# Patient Record
Sex: MALE | URBAN - METROPOLITAN AREA
[De-identification: names, ages, dates, MRNs, and addresses within clinical notes are randomized per-mention and may not be internally consistent; named-entity substitution may affect disease eponyms.]

---
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
## 2023-11-15 ENCOUNTER — OFFICE VISIT (OUTPATIENT)
Dept: FAMILY MEDICINE CLINIC | Age: 18
End: 2023-11-15
Payer: COMMERCIAL

## 2023-11-15 VITALS
HEIGHT: 70 IN | RESPIRATION RATE: 16 BRPM | DIASTOLIC BLOOD PRESSURE: 78 MMHG | BODY MASS INDEX: 22.33 KG/M2 | TEMPERATURE: 97.9 F | SYSTOLIC BLOOD PRESSURE: 120 MMHG | OXYGEN SATURATION: 97 % | WEIGHT: 156 LBS | HEART RATE: 65 BPM

## 2023-11-15 DIAGNOSIS — K58.2 IRRITABLE BOWEL SYNDROME WITH BOTH CONSTIPATION AND DIARRHEA: ICD-10-CM

## 2023-11-15 DIAGNOSIS — F41.9 ANXIETY: Primary | ICD-10-CM

## 2023-11-15 PROCEDURE — 1036F TOBACCO NON-USER: CPT | Performed by: SURGERY

## 2023-11-15 PROCEDURE — 99204 OFFICE O/P NEW MOD 45 MIN: CPT | Performed by: SURGERY

## 2023-11-15 PROCEDURE — G8420 CALC BMI NORM PARAMETERS: HCPCS | Performed by: SURGERY

## 2023-11-15 PROCEDURE — G8427 DOCREV CUR MEDS BY ELIG CLIN: HCPCS | Performed by: SURGERY

## 2023-11-15 PROCEDURE — G8484 FLU IMMUNIZE NO ADMIN: HCPCS | Performed by: SURGERY

## 2023-11-15 RX ORDER — PSEUDOEPHEDRINE HCL 30 MG
TABLET ORAL
COMMUNITY

## 2023-11-15 RX ORDER — DICYCLOMINE HCL 20 MG
20 TABLET ORAL
COMMUNITY
Start: 2023-09-07

## 2023-11-15 RX ORDER — AMITRIPTYLINE HYDROCHLORIDE 10 MG/1
10 TABLET, FILM COATED ORAL NIGHTLY
COMMUNITY
Start: 2023-11-13

## 2023-11-15 RX ORDER — PANTOPRAZOLE SODIUM 40 MG/1
1 TABLET, DELAYED RELEASE ORAL
COMMUNITY
Start: 2023-09-07

## 2023-11-15 RX ORDER — ESCITALOPRAM OXALATE 10 MG/1
10 TABLET ORAL DAILY
Qty: 30 TABLET | Refills: 0 | Status: SHIPPED | OUTPATIENT
Start: 2023-11-15

## 2023-11-15 SDOH — ECONOMIC STABILITY: FOOD INSECURITY: WITHIN THE PAST 12 MONTHS, YOU WORRIED THAT YOUR FOOD WOULD RUN OUT BEFORE YOU GOT MONEY TO BUY MORE.: NEVER TRUE

## 2023-11-15 SDOH — ECONOMIC STABILITY: FOOD INSECURITY: WITHIN THE PAST 12 MONTHS, THE FOOD YOU BOUGHT JUST DIDN'T LAST AND YOU DIDN'T HAVE MONEY TO GET MORE.: NEVER TRUE

## 2023-11-15 SDOH — ECONOMIC STABILITY: HOUSING INSECURITY
IN THE LAST 12 MONTHS, WAS THERE A TIME WHEN YOU DID NOT HAVE A STEADY PLACE TO SLEEP OR SLEPT IN A SHELTER (INCLUDING NOW)?: NO

## 2023-11-15 SDOH — ECONOMIC STABILITY: INCOME INSECURITY: HOW HARD IS IT FOR YOU TO PAY FOR THE VERY BASICS LIKE FOOD, HOUSING, MEDICAL CARE, AND HEATING?: NOT HARD AT ALL

## 2023-11-15 ASSESSMENT — PATIENT HEALTH QUESTIONNAIRE - PHQ9
SUM OF ALL RESPONSES TO PHQ QUESTIONS 1-9: 0
SUM OF ALL RESPONSES TO PHQ9 QUESTIONS 1 & 2: 0
2. FEELING DOWN, DEPRESSED OR HOPELESS: 0
SUM OF ALL RESPONSES TO PHQ QUESTIONS 1-9: 0
1. LITTLE INTEREST OR PLEASURE IN DOING THINGS: 0

## 2023-11-15 ASSESSMENT — ENCOUNTER SYMPTOMS
SORE THROAT: 0
SHORTNESS OF BREATH: 0
CONSTIPATION: 1
ABDOMINAL PAIN: 1
DIARRHEA: 1
NAUSEA: 0
COUGH: 0

## 2023-11-15 ASSESSMENT — ANXIETY QUESTIONNAIRES
2. NOT BEING ABLE TO STOP OR CONTROL WORRYING: 1
7. FEELING AFRAID AS IF SOMETHING AWFUL MIGHT HAPPEN: 3
4. TROUBLE RELAXING: 3
3. WORRYING TOO MUCH ABOUT DIFFERENT THINGS: 1
6. BECOMING EASILY ANNOYED OR IRRITABLE: 0
1. FEELING NERVOUS, ANXIOUS, OR ON EDGE: 3
IF YOU CHECKED OFF ANY PROBLEMS ON THIS QUESTIONNAIRE, HOW DIFFICULT HAVE THESE PROBLEMS MADE IT FOR YOU TO DO YOUR WORK, TAKE CARE OF THINGS AT HOME, OR GET ALONG WITH OTHER PEOPLE: EXTREMELY DIFFICULT
5. BEING SO RESTLESS THAT IT IS HARD TO SIT STILL: 1
GAD7 TOTAL SCORE: 12

## 2023-11-15 NOTE — PROGRESS NOTES
Transportation     Lack of Transportation (Medical): Not on file     Lack of Transportation (Non-Medical): No   Physical Activity: Not on file   Stress: Not on file   Social Connections: Not on file   Intimate Partner Violence: Not on file   Housing Stability: Unknown (11/15/2023)    Housing Stability Vital Sign     Unable to Pay for Housing in the Last Year: Not on file     Number of Places Lived in the Last Year: Not on file     Unstable Housing in the Last Year: No       Family History   Problem Relation Age of Onset    Diabetes Paternal Grandmother        Current Outpatient Medications   Medication Sig Dispense Refill    amitriptyline (ELAVIL) 10 MG tablet Take 1 tablet by mouth nightly      dicyclomine (BENTYL) 20 MG tablet Take 1 tablet by mouth      pantoprazole (PROTONIX) 40 MG tablet Take 1 tablet by mouth Every Day      escitalopram (LEXAPRO) 10 MG tablet Take 1 tablet by mouth daily 30 tablet 0    docusate (COLACE, DULCOLAX) 100 MG CAPS 1 CAPSULE AS NEEDED ORALLY 2 TIMES A DAY AS NEEDED WHEN HAVING CONSTIPATION 30 DAYS for 30      albuterol (PROVENTIL HFA;VENTOLIN HFA) 108 (90 BASE) MCG/ACT inhaler Inhale 2 Puffs into the lungs every 4 hours as needed for Wheezing. (Patient not taking: Reported on 11/15/2023) 1 Inhaler 0     No current facility-administered medications for this visit. Allergies   Allergen Reactions    Penicillin G      Other Reaction(s): Unknown       No results found for any previous visit. Review of Systems   Constitutional:  Negative for chills and fever. HENT:  Negative for congestion, ear pain and sore throat. Respiratory:  Negative for cough and shortness of breath. Cardiovascular:  Negative for chest pain, palpitations and leg swelling. Gastrointestinal:  Positive for abdominal pain, constipation and diarrhea. Negative for nausea. Genitourinary:  Negative for difficulty urinating and hematuria. Neurological:  Negative for numbness and headaches.

## 2023-11-28 ENCOUNTER — TELEPHONE (OUTPATIENT)
Dept: FAMILY MEDICINE CLINIC | Age: 18
End: 2023-11-28

## 2023-11-28 NOTE — TELEPHONE ENCOUNTER
Spoke with patient and Stated that he was having Suicidal thoughts and that medical is slightly helping spoke with Dr. Irma Madera and She stated he needs to go to er patient advised and appointment also made with Dr. Bernice Torres

## 2023-11-28 NOTE — TELEPHONE ENCOUNTER
Patient called and reported he is in the parking lot at his work having a breakdown. He is reaching out to speak with his provider. This call was transferred to clinical staff for further triage.

## 2023-11-29 ENCOUNTER — OFFICE VISIT (OUTPATIENT)
Dept: FAMILY MEDICINE CLINIC | Age: 18
End: 2023-11-29
Payer: COMMERCIAL

## 2023-11-29 VITALS
RESPIRATION RATE: 16 BRPM | DIASTOLIC BLOOD PRESSURE: 115 MMHG | HEIGHT: 70 IN | HEART RATE: 104 BPM | SYSTOLIC BLOOD PRESSURE: 159 MMHG | OXYGEN SATURATION: 99 % | BODY MASS INDEX: 21.62 KG/M2 | WEIGHT: 151 LBS | TEMPERATURE: 97.1 F

## 2023-11-29 DIAGNOSIS — F41.9 ANXIETY AND DEPRESSION: Primary | ICD-10-CM

## 2023-11-29 DIAGNOSIS — F32.A ANXIETY AND DEPRESSION: Primary | ICD-10-CM

## 2023-11-29 PROCEDURE — G8427 DOCREV CUR MEDS BY ELIG CLIN: HCPCS | Performed by: STUDENT IN AN ORGANIZED HEALTH CARE EDUCATION/TRAINING PROGRAM

## 2023-11-29 PROCEDURE — 1036F TOBACCO NON-USER: CPT | Performed by: STUDENT IN AN ORGANIZED HEALTH CARE EDUCATION/TRAINING PROGRAM

## 2023-11-29 PROCEDURE — G8484 FLU IMMUNIZE NO ADMIN: HCPCS | Performed by: STUDENT IN AN ORGANIZED HEALTH CARE EDUCATION/TRAINING PROGRAM

## 2023-11-29 PROCEDURE — 99214 OFFICE O/P EST MOD 30 MIN: CPT | Performed by: STUDENT IN AN ORGANIZED HEALTH CARE EDUCATION/TRAINING PROGRAM

## 2023-11-29 PROCEDURE — G8420 CALC BMI NORM PARAMETERS: HCPCS | Performed by: STUDENT IN AN ORGANIZED HEALTH CARE EDUCATION/TRAINING PROGRAM

## 2023-11-29 RX ORDER — HYDROXYZINE PAMOATE 25 MG/1
25 CAPSULE ORAL 3 TIMES DAILY PRN
Qty: 30 CAPSULE | Refills: 0 | Status: SHIPPED | OUTPATIENT
Start: 2023-11-29 | End: 2023-12-29

## 2023-11-29 RX ORDER — PANTOPRAZOLE SODIUM 40 MG/1
40 TABLET, DELAYED RELEASE ORAL DAILY
COMMUNITY

## 2023-11-29 SDOH — ECONOMIC STABILITY: FOOD INSECURITY: WITHIN THE PAST 12 MONTHS, YOU WORRIED THAT YOUR FOOD WOULD RUN OUT BEFORE YOU GOT MONEY TO BUY MORE.: OFTEN TRUE

## 2023-11-29 SDOH — ECONOMIC STABILITY: INCOME INSECURITY: HOW HARD IS IT FOR YOU TO PAY FOR THE VERY BASICS LIKE FOOD, HOUSING, MEDICAL CARE, AND HEATING?: VERY HARD

## 2023-11-29 SDOH — ECONOMIC STABILITY: FOOD INSECURITY: WITHIN THE PAST 12 MONTHS, THE FOOD YOU BOUGHT JUST DIDN'T LAST AND YOU DIDN'T HAVE MONEY TO GET MORE.: SOMETIMES TRUE

## 2023-11-29 ASSESSMENT — PATIENT HEALTH QUESTIONNAIRE - PHQ9
9. THOUGHTS THAT YOU WOULD BE BETTER OFF DEAD, OR OF HURTING YOURSELF: 1
10. IF YOU CHECKED OFF ANY PROBLEMS, HOW DIFFICULT HAVE THESE PROBLEMS MADE IT FOR YOU TO DO YOUR WORK, TAKE CARE OF THINGS AT HOME, OR GET ALONG WITH OTHER PEOPLE: 3
7. TROUBLE CONCENTRATING ON THINGS, SUCH AS READING THE NEWSPAPER OR WATCHING TELEVISION: 3
SUM OF ALL RESPONSES TO PHQ QUESTIONS 1-9: 25
1. LITTLE INTEREST OR PLEASURE IN DOING THINGS: 3
SUM OF ALL RESPONSES TO PHQ QUESTIONS 1-9: 25
SUM OF ALL RESPONSES TO PHQ9 QUESTIONS 1 & 2: 6
3. TROUBLE FALLING OR STAYING ASLEEP: 3
6. FEELING BAD ABOUT YOURSELF - OR THAT YOU ARE A FAILURE OR HAVE LET YOURSELF OR YOUR FAMILY DOWN: 3
5. POOR APPETITE OR OVEREATING: 3
SUM OF ALL RESPONSES TO PHQ QUESTIONS 1-9: 24
4. FEELING TIRED OR HAVING LITTLE ENERGY: 3
2. FEELING DOWN, DEPRESSED OR HOPELESS: 3
8. MOVING OR SPEAKING SO SLOWLY THAT OTHER PEOPLE COULD HAVE NOTICED. OR THE OPPOSITE, BEING SO FIGETY OR RESTLESS THAT YOU HAVE BEEN MOVING AROUND A LOT MORE THAN USUAL: 3
SUM OF ALL RESPONSES TO PHQ QUESTIONS 1-9: 25

## 2023-11-29 ASSESSMENT — ENCOUNTER SYMPTOMS
NAUSEA: 0
SHORTNESS OF BREATH: 0

## 2023-11-29 ASSESSMENT — COLUMBIA-SUICIDE SEVERITY RATING SCALE - C-SSRS
5. HAVE YOU STARTED TO WORK OUT OR WORKED OUT THE DETAILS OF HOW TO KILL YOURSELF? DO YOU INTEND TO CARRY OUT THIS PLAN?: NO
1. WITHIN THE PAST MONTH, HAVE YOU WISHED YOU WERE DEAD OR WISHED YOU COULD GO TO SLEEP AND NOT WAKE UP?: YES
4. HAVE YOU HAD THESE THOUGHTS AND HAD SOME INTENTION OF ACTING ON THEM?: NO
6. HAVE YOU EVER DONE ANYTHING, STARTED TO DO ANYTHING, OR PREPARED TO DO ANYTHING TO END YOUR LIFE?: NO
3. HAVE YOU BEEN THINKING ABOUT HOW YOU MIGHT KILL YOURSELF?: YES
2. HAVE YOU ACTUALLY HAD ANY THOUGHTS OF KILLING YOURSELF?: YES

## 2023-11-29 NOTE — PROGRESS NOTES
[place] [address], [address] [facility]  Tel: [phone]      2023   SUBJECTIVE/OBJECTIVE  HPI    Roderick Blanton (:  2005) is a 19 y.o. male, here for evaluation of the following medical concerns:  Chief Complaint   Patient presents with    Anxiety     Follow up     Patient is a 23-year-old male presents to discuss anxiety and depression. Anxiety ?/ Depression? ? Feeling anxious around new people. Recently called with thoughts of suicidal ideation. History of anxiety recently started on Lexapro 10 mg daily, has taken the medication for 2 weeks . Side effect dry mouth. Stopped Elavil 10 mg daily , took 1 time. Triggers for anxiety include meeting new people. he problem has been gradually worsening. Symptoms include chest pain, decreased concentration, depressed mood, dry mouth, excessive worry (finances), insomnia, irritability, nervous/anxious behavior, palpitations (heart poujnding), panic, restlessness and suicidal ideas. Patient reports no compulsions, feeling of choking, hyperventilation, nausea or shortness of breath. Symptoms occur constantly. Felt sick on medications when he was 23 , may have been Buspar and prozac. Reports anxiety attacks daily \" spiraling thoughts, can't focus, heart racing\". Not seeing any therapist,scheduled with [facility]. Feeling like a burden. Sleep is sporadic , sometimes sleeps early or can't sleep. Goes to bed 10pm and 6am. No dreams. Has the support of friends, and friend's mom. Has a poor relationship with his mother and father. Reports that his mother is manipulative, brother received preferential treatment over him. Marie Horowitz However still financially dependent on his mother. Smokes marijuana for stomach and anxiety. Works at [employer]. Today reports no active planning for SI. Tired of feeling \"shitty\" all the time,   Enjoys playing video games, read poems.  Researches on [employer],

## 2023-12-04 ENCOUNTER — TELEPHONE (OUTPATIENT)
Dept: PSYCHOLOGY | Age: 18
End: 2023-12-04

## 2023-12-05 ENCOUNTER — TELEMEDICINE (OUTPATIENT)
Dept: PSYCHOLOGY | Age: 18
End: 2023-12-05
Payer: COMMERCIAL

## 2023-12-05 DIAGNOSIS — F32.A DEPRESSIVE DISORDER: ICD-10-CM

## 2023-12-05 DIAGNOSIS — F41.9 ANXIETY: Primary | ICD-10-CM

## 2023-12-05 PROCEDURE — 1036F TOBACCO NON-USER: CPT | Performed by: SOCIAL WORKER

## 2023-12-05 PROCEDURE — 90791 PSYCH DIAGNOSTIC EVALUATION: CPT | Performed by: SOCIAL WORKER

## 2023-12-05 NOTE — PROGRESS NOTES
logical connections  Insight: good  Judgment    Intact  Appetite abnormal: decreased  Sleep disturbance Yes  Fatigue Yes  Loss of pleasure Yes  Impulsive behavior No  Morbid Ideation: no   Suicide Assessment: no suicidal ideation, plan, or intent  Homicidal Ideation: no    History:    Medications:   Current Outpatient Medications   Medication Sig Dispense Refill    pantoprazole (PROTONIX) 40 MG tablet Take 1 tablet by mouth daily      hydrOXYzine pamoate (VISTARIL) 25 MG capsule Take 1 capsule by mouth 3 times daily as needed for Anxiety 30 capsule 0    amitriptyline (ELAVIL) 10 MG tablet Take 1 tablet by mouth nightly (Patient not taking: Reported on 11/29/2023)      dicyclomine (BENTYL) 20 MG tablet Take 1 tablet by mouth      escitalopram (LEXAPRO) 10 MG tablet Take 1 tablet by mouth daily 30 tablet 0     No current facility-administered medications for this visit.      Social History:   Social History     Socioeconomic History    Marital status: Single     Spouse name: Not on file    Number of children: Not on file    Years of education: Not on file    Highest education level: Not on file   Occupational History    Not on file   Tobacco Use    Smoking status: Never     Passive exposure: Past    Smokeless tobacco: Never    Tobacco comments:     secondary exposure   Vaping Use    Vaping Use: Every day    Substances: Nicotine, Flavoring    Devices: Disposable   Substance and Sexual Activity    Alcohol use: Not Currently    Drug use: Yes     Types: Marijuana Fawad Sheila)    Sexual activity: Not on file   Other Topics Concern    Not on file   Social History Narrative    Not on file     Social Determinants of Health     Financial Resource Strain: High Risk (11/29/2023)    Overall Financial Resource Strain (CARDIA)     Difficulty of Paying Living Expenses: Very hard   Food Insecurity: Not on file (11/29/2023)   Recent Concern: [facility] Present (11/29/2023)    Hunger Vital Sign     Worried About Running

## 2023-12-12 DIAGNOSIS — F41.9 ANXIETY: ICD-10-CM

## 2023-12-12 RX ORDER — ESCITALOPRAM OXALATE 10 MG/1
10 TABLET ORAL DAILY
Qty: 30 TABLET | Refills: 0 | Status: SHIPPED | OUTPATIENT
Start: 2023-12-12

## 2023-12-12 NOTE — TELEPHONE ENCOUNTER
11/15/2023    Future Appointments   Date Time Provider [address]   12/18/2023  8:00 AM Roxana Liu DO [facility]   1/4/2024  2:30 PM Enid Lopez [facility]

## 2023-12-18 PROBLEM — F41.9 ANXIETY AND DEPRESSION: Status: ACTIVE | Noted: 2023-12-18

## 2023-12-18 PROBLEM — F32.A ANXIETY AND DEPRESSION: Status: ACTIVE | Noted: 2023-12-18

## 2023-12-18 PROBLEM — K58.2 IRRITABLE BOWEL SYNDROME WITH BOTH CONSTIPATION AND DIARRHEA: Status: ACTIVE | Noted: 2023-12-18

## 2023-12-18 PROBLEM — F41.9 ANXIETY: Status: ACTIVE | Noted: 2023-12-18

## 2023-12-20 NOTE — PATIENT INSTRUCTIONS
Recommended mindfulness meditation and exercise. Sleep hygiene. Headspace radha. Regular exercise. Hydroxyzine 25 mg as needed for anxiety may cause drowsiness       Call the Suicide and [address] at 65. Call 8-561-146-RCSG (9-813.683.7315). Text HOME to 107851 to access the Crisis Text Line. Consider saving these numbers in your phone. Go to [employer] for more information or to chat online. Watch closely for changes in your health, and be sure to contact your doctor if:    Your panic attacks get worse. You have new or different anxiety. You are not getting better as expected. What are the possible side effects of hydroxyzine? Get emergency medical help if you have signs of an allergic reaction:  hives; difficult breathing; swelling of your face, lips, tongue, or throat. In rare cases, hydroxyzine may cause a severe skin reaction. Stop taking this medicine and call your doctor right away if you have sudden skin redness or a rash that spreads and causes white or yellow pustules, blistering, or peeling. Stop using hydroxyzine and call your doctor at once if you have:  fast or pounding heartbeats;  headache with chest pain;  severe dizziness, fainting; or  a seizure (convulsions). Side effects such as drowsiness and confusion may be more likely in older adults. Common side effects may include:  drowsiness;  headache;  dry mouth; or  skin rash.
with patient

## 2024-01-04 ENCOUNTER — TELEPHONE (OUTPATIENT)
Dept: PSYCHOLOGY | Age: 19
End: 2024-01-04

## 2024-01-04 NOTE — TELEPHONE ENCOUNTER
Called Pt to follow up on missed virtual visit. Left voicemail message encouraging Pt to call office to reschedule appointment.

## 2024-01-05 ENCOUNTER — TELEPHONE (OUTPATIENT)
Dept: FAMILY MEDICINE CLINIC | Age: 19
End: 2024-01-05

## 2024-01-05 NOTE — TELEPHONE ENCOUNTER
Patient calling. Has been having a lot of anxiety and depression. Has been on lexapro for about 2 months, and was recently started on something else for anxiety unsure of what it is (possibly hydroxyzine). Took that twice yesterday, once in the morning and once in the afternoon. Having some suicidal thoughts, most recent was yesterday. States today he just feels numb. Like he has no emotions. Advised patient that he should be evaluated in the ER. Recommend he go to [facility]. Patient states that he understands and agrees to go to the ER.

## 2024-01-21 ENCOUNTER — HOSPITAL ENCOUNTER (INPATIENT)
Age: 19
LOS: 5 days | Discharge: HOME | End: 2024-01-26
Attending: EMERGENCY MEDICINE | Admitting: PSYCHIATRY & NEUROLOGY
Payer: COMMERCIAL

## 2024-01-21 DIAGNOSIS — F29 PSYCHOSIS, UNSPECIFIED PSYCHOSIS TYPE (HCC): ICD-10-CM

## 2024-01-21 DIAGNOSIS — F41.9 ANXIETY: Primary | ICD-10-CM

## 2024-01-21 LAB
ALBUMIN SERPL-MCNC: 5.1 G/DL (ref 3.4–5)
ALBUMIN/GLOB SERPL: 2.2 {RATIO} (ref 1.1–2.2)
ALP SERPL-CCNC: 47 U/L (ref 40–129)
ALT SERPL-CCNC: 11 U/L (ref 10–40)
AMPHETAMINES UR QL SCN>1000 NG/ML: ABNORMAL
ANION GAP SERPL CALCULATED.3IONS-SCNC: 12 MMOL/L (ref 3–16)
APAP SERPL-MCNC: <5 UG/ML (ref 10–30)
AST SERPL-CCNC: 16 U/L (ref 15–37)
BARBITURATES UR QL SCN>200 NG/ML: ABNORMAL
BASOPHILS # BLD: 0 K/UL (ref 0–0.2)
BASOPHILS NFR BLD: 0.7 %
BENZODIAZ UR QL SCN>200 NG/ML: ABNORMAL
BILIRUB SERPL-MCNC: 0.5 MG/DL (ref 0–1)
BILIRUB UR QL STRIP.AUTO: ABNORMAL
BUN SERPL-MCNC: 10 MG/DL (ref 7–20)
CALCIUM SERPL-MCNC: 9.7 MG/DL (ref 8.3–10.6)
CANNABINOIDS UR QL SCN>50 NG/ML: POSITIVE
CHLORIDE SERPL-SCNC: 101 MMOL/L (ref 99–110)
CLARITY UR: CLEAR
CO2 SERPL-SCNC: 23 MMOL/L (ref 21–32)
COCAINE UR QL SCN: ABNORMAL
COLOR UR: YELLOW
CREAT SERPL-MCNC: 0.8 MG/DL (ref 0.9–1.3)
DEPRECATED RDW RBC AUTO: 13.5 % (ref 12.4–15.4)
DRUG SCREEN COMMENT UR-IMP: ABNORMAL
EOSINOPHIL # BLD: 0 K/UL (ref 0–0.6)
EOSINOPHIL NFR BLD: 0.1 %
ETHANOLAMINE SERPL-MCNC: NORMAL MG/DL (ref 0–0.08)
FENTANYL SCREEN, URINE: ABNORMAL
FLUAV RNA RESP QL NAA+PROBE: NOT DETECTED
FLUBV RNA RESP QL NAA+PROBE: NOT DETECTED
GFR SERPLBLD CREATININE-BSD FMLA CKD-EPI: >60 ML/MIN/{1.73_M2}
GLUCOSE SERPL-MCNC: 111 MG/DL (ref 70–99)
GLUCOSE UR STRIP.AUTO-MCNC: NEGATIVE MG/DL
HCT VFR BLD AUTO: 44.9 % (ref 40.5–52.5)
HGB BLD-MCNC: 15.3 G/DL (ref 13.5–17.5)
HGB UR QL STRIP.AUTO: NEGATIVE
KETONES UR STRIP.AUTO-MCNC: 40 MG/DL
LEUKOCYTE ESTERASE UR QL STRIP.AUTO: NEGATIVE
LYMPHOCYTES # BLD: 1.3 K/UL (ref 1–5.1)
LYMPHOCYTES NFR BLD: 18.5 %
MCH RBC QN AUTO: 28.9 PG (ref 26–34)
MCHC RBC AUTO-ENTMCNC: 34 G/DL (ref 31–36)
MCV RBC AUTO: 85 FL (ref 80–100)
METHADONE UR QL SCN>300 NG/ML: ABNORMAL
MONOCYTES # BLD: 0.5 K/UL (ref 0–1.3)
MONOCYTES NFR BLD: 7.7 %
NEUTROPHILS # BLD: 5.1 K/UL (ref 1.7–7.7)
NEUTROPHILS NFR BLD: 73 %
NITRITE UR QL STRIP.AUTO: NEGATIVE
OPIATES UR QL SCN>300 NG/ML: ABNORMAL
OXYCODONE UR QL SCN: ABNORMAL
PCP UR QL SCN>25 NG/ML: ABNORMAL
PH UR STRIP.AUTO: 6 [PH] (ref 5–8)
PH UR STRIP: 6 [PH]
PLATELET # BLD AUTO: 233 K/UL (ref 135–450)
PMV BLD AUTO: 8.7 FL (ref 5–10.5)
POTASSIUM SERPL-SCNC: 3.6 MMOL/L (ref 3.5–5.1)
PROT SERPL-MCNC: 7.4 G/DL (ref 6.4–8.2)
PROT UR STRIP.AUTO-MCNC: NEGATIVE MG/DL
RBC # BLD AUTO: 5.29 M/UL (ref 4.2–5.9)
SALICYLATES SERPL-MCNC: <0.3 MG/DL (ref 15–30)
SARS-COV-2 RNA RESP QL NAA+PROBE: NOT DETECTED
SODIUM SERPL-SCNC: 136 MMOL/L (ref 136–145)
SP GR UR STRIP.AUTO: 1.02 (ref 1–1.03)
UA COMPLETE W REFLEX CULTURE PNL UR: ABNORMAL
UA DIPSTICK W REFLEX MICRO PNL UR: ABNORMAL
URN SPEC COLLECT METH UR: ABNORMAL
UROBILINOGEN UR STRIP-ACNC: 1 E.U./DL
WBC # BLD AUTO: 7 K/UL (ref 4–11)

## 2024-01-21 PROCEDURE — 80053 COMPREHEN METABOLIC PANEL: CPT

## 2024-01-21 PROCEDURE — 80179 DRUG ASSAY SALICYLATE: CPT

## 2024-01-21 PROCEDURE — 36415 COLL VENOUS BLD VENIPUNCTURE: CPT

## 2024-01-21 PROCEDURE — 1240000000 HC EMOTIONAL WELLNESS R&B

## 2024-01-21 PROCEDURE — 81003 URINALYSIS AUTO W/O SCOPE: CPT

## 2024-01-21 PROCEDURE — 99285 EMERGENCY DEPT VISIT HI MDM: CPT

## 2024-01-21 PROCEDURE — 87636 SARSCOV2 & INF A&B AMP PRB: CPT

## 2024-01-21 PROCEDURE — 6370000000 HC RX 637 (ALT 250 FOR IP): Performed by: REGISTERED NURSE

## 2024-01-21 PROCEDURE — 80307 DRUG TEST PRSMV CHEM ANLYZR: CPT

## 2024-01-21 PROCEDURE — 85025 COMPLETE CBC W/AUTO DIFF WBC: CPT

## 2024-01-21 PROCEDURE — 82077 ASSAY SPEC XCP UR&BREATH IA: CPT

## 2024-01-21 PROCEDURE — 80143 DRUG ASSAY ACETAMINOPHEN: CPT

## 2024-01-21 RX ORDER — OLANZAPINE 10 MG/1
10 TABLET ORAL 2 TIMES DAILY PRN
Status: DISCONTINUED | OUTPATIENT
Start: 2024-01-21 | End: 2024-01-26 | Stop reason: HOSPADM

## 2024-01-21 RX ORDER — LORAZEPAM 2 MG/1
2 TABLET ORAL 2 TIMES DAILY PRN
Status: COMPLETED | OUTPATIENT
Start: 2024-01-21 | End: 2024-01-23

## 2024-01-21 RX ORDER — HYDROXYZINE HYDROCHLORIDE 25 MG/1
25 TABLET, FILM COATED ORAL ONCE
Status: COMPLETED | OUTPATIENT
Start: 2024-01-21 | End: 2024-01-21

## 2024-01-21 RX ORDER — ACETAMINOPHEN 325 MG/1
650 TABLET ORAL EVERY 8 HOURS PRN
Status: DISCONTINUED | OUTPATIENT
Start: 2024-01-21 | End: 2024-01-26 | Stop reason: HOSPADM

## 2024-01-21 RX ORDER — TRAZODONE HYDROCHLORIDE 50 MG/1
50 TABLET ORAL NIGHTLY PRN
Status: DISCONTINUED | OUTPATIENT
Start: 2024-01-21 | End: 2024-01-26 | Stop reason: HOSPADM

## 2024-01-21 RX ORDER — POLYETHYLENE GLYCOL 3350 17 G
2 POWDER IN PACKET (EA) ORAL
Status: DISCONTINUED | OUTPATIENT
Start: 2024-01-21 | End: 2024-01-26 | Stop reason: HOSPADM

## 2024-01-21 RX ORDER — OLANZAPINE 5 MG/1
10 TABLET, ORALLY DISINTEGRATING ORAL ONCE
Status: COMPLETED | OUTPATIENT
Start: 2024-01-21 | End: 2024-01-21

## 2024-01-21 RX ORDER — LORAZEPAM 2 MG/ML
2 INJECTION INTRAMUSCULAR ONCE
Status: DISCONTINUED | OUTPATIENT
Start: 2024-01-21 | End: 2024-01-23

## 2024-01-21 RX ORDER — DIPHENHYDRAMINE HYDROCHLORIDE 50 MG/ML
50 INJECTION INTRAMUSCULAR; INTRAVENOUS ONCE
Status: DISCONTINUED | OUTPATIENT
Start: 2024-01-21 | End: 2024-01-23

## 2024-01-21 RX ORDER — HYDROXYZINE 50 MG/1
50 TABLET, FILM COATED ORAL 3 TIMES DAILY PRN
Status: DISCONTINUED | OUTPATIENT
Start: 2024-01-21 | End: 2024-01-26 | Stop reason: HOSPADM

## 2024-01-21 RX ORDER — HALOPERIDOL 5 MG/ML
5 INJECTION INTRAMUSCULAR ONCE
Status: DISCONTINUED | OUTPATIENT
Start: 2024-01-21 | End: 2024-01-23

## 2024-01-21 RX ADMIN — HYDROXYZINE HYDROCHLORIDE 25 MG: 25 TABLET ORAL at 16:30

## 2024-01-21 RX ADMIN — OLANZAPINE 10 MG: 5 TABLET, ORALLY DISINTEGRATING ORAL at 17:33

## 2024-01-21 ASSESSMENT — PAIN - FUNCTIONAL ASSESSMENT: PAIN_FUNCTIONAL_ASSESSMENT: NONE - DENIES PAIN

## 2024-01-21 NOTE — ED NOTES
While this RN was at bedside pt stated \"can I be honest with you? I feel like I can trust you. My name is Dawson Emanuel, but I am actually Jacob. I was sent here from God. If you believe in him then you will know this is true. I feel so much better now that I told someone. I knew I was meant to meet you today. Should I tell that other girl, or just keep it a secret?\" This RN informed pt it is up to him if he wants to tell anyone else. Pt stated \"it felt so good to tell you that I feel like I should tell her too, but only if you promise to be in here with her when I tell her\" Darcie MARY notified.

## 2024-01-21 NOTE — ED NOTES
Pt stated to this RN \"I am going to let you poke me, cause I trust you. Honestly I feel like I could cry right now, I am so happy, I finally feel like I am going to get the help I need\"

## 2024-01-21 NOTE — ED PROVIDER NOTES
In addition to the advanced practice provider, I personally saw Dawson Emanuel and performed a substantive portion of the visit including all aspects of the medical decision making.    Medical Decision Making  Patient seen and evaluated at bedside.  Briefly, he is presenting via private car for psychiatric evaluation because he has becoming increasingly anxious and having difficulty sleeping.  Patient has no suicidal ideation, however he is having obvious delusions and agitation and he was thus placed on involuntary hold.  Lab workup reviewed which is essentially nonsignificant; no anemia, leukocytosis or significant electrolyte abnormalities.  Urine drug screen only positive for cannabinoids.  Patient medically cleared from our standpoint and was evaluated by social work/psychiatry.  After evaluation, admission to psychiatric unit is recommended.    EKG  N/A    SEP-1  Is this patient to be included in the SEP-1 Core Measure due to severe sepsis or septic shock?   No   Exclusion criteria - the patient is NOT to be included for SEP-1 Core Measure due to:  2+ SIRS criteria are not met    Screenings                   Patient Referrals:  No follow-up provider specified.    Discharge Medications:  New Prescriptions    No medications on file       FINAL IMPRESSION  1. Anxiety        Blood pressure (!) 174/80, pulse 97, temperature 98 °F (36.7 °C), temperature source Oral, resp. rate 16, height 1.753 m (5' 9\"), weight 68 kg (150 lb), SpO2 100 %.     I personally saw the patient and made/approved the management plan and take responsibility for the patient management.    For further details of Dawson Emanuel's emergency department encounter, please see documentation by advanced practice provider, Darcie Magana.      Anya Liu DO  01/21/24 6378

## 2024-01-21 NOTE — ED NOTES
Orders placed on pt by provider prior to pt being seen. Pt denies SI/HI, pt states he is terrified of needles and will leave if someone attempts to poke him. This RN updated NP and requested for NP to see pt before completing orders. Pt and 1 visitor placed in R5.

## 2024-01-21 NOTE — ED PROVIDER NOTES
I was notified by nursing that patient had become stating that he was God.  I did go to patient's bedside and patient's with a very large smile leaned back in the bed with his hands behind his head and stated \"I am a reincarnation of God\".  Patient states \"now that I have told you I do not need anything else\".  Patient remains very paranoid.  At this time decision made to place patient on involuntary hold for psychiatric evaluation, nursing staff is aware.    Laboratory studies were evaluated  Urinalysis reveals a small amount of bili, 40 ketones but otherwise negative for nitrates or leukocytes.  Urine drug screen positive for cannabinoids  CBC is negative for leukocytosis or anemias  CMP is negative for electrolyte dyscrasia  Alcohol negative  Tylenol negative  Salicylate negative    On reevaluation I was notified by patient's visitor who came outside of the room stating patient was Becoming increasingly agitated stating that he was going to leave the emergency department.  I did notify the family friend the patient was on involuntary hold.  Patient becoming increasingly agitated.  Orders placed for oral Zyprexa as well as B-52 injection.  Nursing will attempt to get patient to compliant with oral medications initially.  Patient was agreeable to taking oral Zyprexa, B-52 held at this time.  Patient does seem to be more calm after taking medications however still having delusions that he is Jacob.  After psychiatric evaluation they did recommend disposition of admission.    Social Determinants Significantly Affecting Health : None    Is this patient to be included in the SEP-1 Core Measure due to severe sepsis or septic shock?   No   Exclusion criteria - the patient is NOT to be included for SEP-1 Core Measure due to:  Infection is not suspected    Discharge Time out:  CC Reviewed Yes   Test Results Yes     Vitals:    01/23/24 2042   BP: (!) 146/97   Pulse: 78   Resp: 16   Temp: 97.3 °F (36.3 °C)   SpO2: 98%               FINAL IMPRESSION      1. Anxiety    2. Psychosis, unspecified psychosis type (HCC)          DISPOSITION/PLAN   DISPOSITION Admitted 01/21/2024 07:23:25 PM      PATIENT REFERREDTO:  No follow-up provider specified.    DISCHARGE MEDICATIONS:  Current Discharge Medication List          DISCONTINUED MEDICATIONS:  Current Discharge Medication List                 (Please note that portions ofthis note were completed with a voice recognition program.  Efforts were made to edit the dictations but occasionally words are mis-transcribed.)    BHARAT Parra CNP (electronically signed)       Darcie Magana APRN - CNP  01/23/24 2047

## 2024-01-21 NOTE — ED NOTES
Pt ambulated from R5 to B2 with this RN, pt changed into gown for staff. Pt belongings placed in bag. Pt sitting in chair in B2. Pt stated \"oh I understand you have to put me in this gown and make me look crazy when I leave so the others wont know\"

## 2024-01-22 PROBLEM — F12.90 CANNABIS USE, UNSPECIFIED, UNCOMPLICATED: Status: ACTIVE | Noted: 2024-01-22

## 2024-01-22 PROCEDURE — 6370000000 HC RX 637 (ALT 250 FOR IP)

## 2024-01-22 PROCEDURE — 1240000000 HC EMOTIONAL WELLNESS R&B

## 2024-01-22 PROCEDURE — 99223 1ST HOSP IP/OBS HIGH 75: CPT

## 2024-01-22 PROCEDURE — 6370000000 HC RX 637 (ALT 250 FOR IP): Performed by: PSYCHIATRY & NEUROLOGY

## 2024-01-22 PROCEDURE — 99221 1ST HOSP IP/OBS SF/LOW 40: CPT

## 2024-01-22 RX ORDER — ESCITALOPRAM OXALATE 10 MG/1
10 TABLET ORAL DAILY
Status: DISCONTINUED | OUTPATIENT
Start: 2024-01-22 | End: 2024-01-26 | Stop reason: HOSPADM

## 2024-01-22 RX ORDER — ARIPIPRAZOLE 10 MG/1
5 TABLET ORAL DAILY
Status: DISCONTINUED | OUTPATIENT
Start: 2024-01-22 | End: 2024-01-23

## 2024-01-22 RX ORDER — PANTOPRAZOLE SODIUM 40 MG/1
40 TABLET, DELAYED RELEASE ORAL DAILY
Status: DISCONTINUED | OUTPATIENT
Start: 2024-01-22 | End: 2024-01-26 | Stop reason: HOSPADM

## 2024-01-22 RX ORDER — DICYCLOMINE HCL 20 MG
20 TABLET ORAL 4 TIMES DAILY PRN
Status: DISCONTINUED | OUTPATIENT
Start: 2024-01-22 | End: 2024-01-26 | Stop reason: HOSPADM

## 2024-01-22 RX ADMIN — HYDROXYZINE HYDROCHLORIDE 50 MG: 50 TABLET, FILM COATED ORAL at 20:27

## 2024-01-22 RX ADMIN — ESCITALOPRAM OXALATE 10 MG: 10 TABLET ORAL at 14:13

## 2024-01-22 RX ADMIN — ARIPIPRAZOLE 5 MG: 10 TABLET ORAL at 14:13

## 2024-01-22 RX ADMIN — LORAZEPAM 2 MG: 2 TABLET ORAL at 14:13

## 2024-01-22 RX ADMIN — TRAZODONE HYDROCHLORIDE 50 MG: 50 TABLET ORAL at 20:27

## 2024-01-22 RX ADMIN — PANTOPRAZOLE SODIUM 40 MG: 40 TABLET, DELAYED RELEASE ORAL at 14:13

## 2024-01-22 RX ADMIN — HYDROXYZINE HYDROCHLORIDE 50 MG: 50 TABLET, FILM COATED ORAL at 02:42

## 2024-01-22 ASSESSMENT — PAIN - FUNCTIONAL ASSESSMENT: PAIN_FUNCTIONAL_ASSESSMENT: NONE - DENIES PAIN

## 2024-01-22 ASSESSMENT — PATIENT HEALTH QUESTIONNAIRE - PHQ9
SUM OF ALL RESPONSES TO PHQ QUESTIONS 1-9: 2
SUM OF ALL RESPONSES TO PHQ QUESTIONS 1-9: 2
1. LITTLE INTEREST OR PLEASURE IN DOING THINGS: 1
SUM OF ALL RESPONSES TO PHQ QUESTIONS 1-9: 2
1. LITTLE INTEREST OR PLEASURE IN DOING THINGS: 1
SUM OF ALL RESPONSES TO PHQ QUESTIONS 1-9: 2
2. FEELING DOWN, DEPRESSED OR HOPELESS: 1
SUM OF ALL RESPONSES TO PHQ QUESTIONS 1-9: 2
2. FEELING DOWN, DEPRESSED OR HOPELESS: 1
SUM OF ALL RESPONSES TO PHQ9 QUESTIONS 1 & 2: 2
SUM OF ALL RESPONSES TO PHQ QUESTIONS 1-9: 2
SUM OF ALL RESPONSES TO PHQ9 QUESTIONS 1 & 2: 2

## 2024-01-22 ASSESSMENT — LIFESTYLE VARIABLES
HOW OFTEN DO YOU HAVE A DRINK CONTAINING ALCOHOL: NEVER
HOW MANY STANDARD DRINKS CONTAINING ALCOHOL DO YOU HAVE ON A TYPICAL DAY: PATIENT DOES NOT DRINK

## 2024-01-22 ASSESSMENT — SLEEP AND FATIGUE QUESTIONNAIRES
SLEEP PATTERN: DIFFICULTY FALLING ASLEEP
DO YOU HAVE DIFFICULTY SLEEPING: YES
DO YOU USE A SLEEP AID: NO
AVERAGE NUMBER OF SLEEP HOURS: 3
DO YOU USE A SLEEP AID: NO
DO YOU HAVE DIFFICULTY SLEEPING: YES
AVERAGE NUMBER OF SLEEP HOURS: 33
SLEEP PATTERN: DIFFICULTY FALLING ASLEEP

## 2024-01-22 NOTE — ED NOTES
Level of Care Disposition: admit     Patient was seen by ED provider and Nursing/SW staff.  The case presented to psychiatric provider on-call .  Based on the ED evaluation and information presented to the provider by this writer it is the recommendation of the on call psychiatric provider that inpatient hospitalization is the least restrictive environment for the patient at this time.  The patient will be admitted to the inpatient unit.           Insurance Pre certification Authorization: Care source           Corina Bhardwaj MSW,LSW
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
## 2024-01-22 NOTE — PLAN OF CARE
[facility]  Treatment Team Note  Review Date & Time: 01/22/24  8935    Patient was not in treatment team      Status EXAM:   Mental Status and Behavioral Exam  Normal: No  Level of Assistance: Independent/Self  Facial Expression: Exaggerated, Worried, Elevated  Affect: Constricted, Unstable  Level of Consciousness: Alert  Frequency of Checks: 4 times per hour, close  Mood:Normal: No  Mood: Anxious  Motor Activity:Normal: No  Motor Activity: Increased  Eye Contact: Good  Observed Behavior: Cooperative, Friendly  Sexual Misconduct History: Current - no  Preception: [place] to person, [place] to place, [place] to time, [place] to situation  Attention:Normal: No  Attention: Unable to concentrate, Distractible  Thought Processes: Flight of ideas  Thought Content:Normal: No  Thought Content: Delusions, Paranoia (but denies when ask)  Depression Symptoms: No problems reported or observed.  Anxiety Symptoms: Generalized  Ave Symptoms: Increased energy, Less need to sleep, Poor judgment, Rapid cycling  Hallucinations: Auditory (comment) (observed with hallucination but denies when ask)  Delusions: Yes  Delusions: Paranoid  Memory:Normal: No  Memory: Poor recent  Insight and Judgment: No  Insight and Judgment: Poor judgment, Poor insight      Suicide Risk CSSR-S:  1) Within the past month, have you wished you were dead or wished you could go to sleep and not wake up? : No  2) Have you actually had any thoughts of killing yourself? : No  6) Have you ever done anything, started to do anything, or prepared to do anything to end your life?: No      PLAN/TREATMENT RECOMMENDATIONS UPDATE:   Patient will take medication as prescribed, eat 75% of meals, attend groups, participate in milieu activities, participate in treatment team and care planning for discharge and follow up.           Lorena Woodard RN

## 2024-01-22 NOTE — PROGRESS NOTES
At 0200 came[place]in [place] [facility] accompanied by staff and security. Body search done(used metal detectors) no contrabands found. Offered snacks but refused. Vitally stable. Accompanied to his room. During the course of admission.. patient seen hyperactive and anxious. Responding to question but with flight of ideas. Obviously observed with hallucination and delusional thoughts but denies when asked. Stated that \" I don't have hallucination but I know I'm talking to myself and god is with me, Im anxious, can you give me hug?\" As verbalized by the patient. Patient irrelevantly responded to some questions but can be re-directed. Noted with racing thoughts. Denies having suicidal thoughts. He was alert and oriented x4. Patient was pleasant and friendly during the admission process. No apparent signs of aggressive behavior. Signed the consent forms. PRN atarax given at 0242 for anxiety. Seen at times. Needs attended.

## 2024-01-22 NOTE — PROGRESS NOTES
[facility]  Admission Note     Admission Type:   Admission Type: Voluntary    Reason for admission:  Reason for Admission: psychosis and anxiety      Addictive Behavior:   Addictive Behavior  In the Past 3 Months, Have You Felt or Has Someone Told You That You Have a Problem With  : None    Medical Problems:   Past Medical History:   Diagnosis Date    Anxiety     Depression     History of IBS        Status EXAM:  Mental Status and Behavioral Exam  Normal: No  Level of Assistance: Independent/Self  Facial Expression: Exaggerated, Worried, Elevated  Affect: Constricted, Unstable  Level of Consciousness: Alert  Frequency of Checks: 4 times per hour, close  Mood:Normal: No  Mood: Anxious  Motor Activity:Normal: No  Motor Activity: Increased  Eye Contact: Good  Observed Behavior: Cooperative, Friendly  Sexual Misconduct History: Current - no  Preception: [place] to person, [place] to place, [place] to time, [place] to situation  Attention:Normal: No  Attention: Unable to concentrate, Distractible  Thought Processes: Flight of ideas  Thought Content:Normal: No  Thought Content: Delusions, Paranoia (but denies when ask)  Depression Symptoms: No problems reported or observed.  Anxiety Symptoms: Generalized  Ave Symptoms: Increased energy, Less need to sleep, Poor judgment, Rapid cycling  Hallucinations: Auditory (comment) (observed with hallucination but denies when ask)  Delusions: Yes  Delusions: Paranoid  Memory:Normal: No  Memory: Poor recent  Insight and Judgment: No  Insight and Judgment: Poor judgment, Poor insight    Tobacco Screening:  Practical Counseling, on admission, reshma X, if applicable and completed (first 3 are required if patient doesn't refuse):            ( ) Recognizing danger situations (included triggers and roadblocks)                    ( ) Coping skills (new ways to manage stress,relaxation techniques, changing routine, distraction)
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
## 2024-01-22 NOTE — H&P
Hospital Medicine History & Physical      PCP: Margaret Liu DO    Date of Admission: 1/21/2024    Date of Service: Pt seen/examined on 01/22/24       Chief Complaint:    Chief Complaint   Patient presents with    Anxiety     Pt states he feels like he has really bad anxiety, feels like his words and thoughts dont match up. Pt states \"I just need someone to help me learn to cope with it\" pt denies SI/HI         History Of Present Illness:      The patient is a 18 y.o. male with anxiety and depression who presented to [facility] ED for uncontrolled anxiety.  Patient was seen and evaluated in the ED by the ED medical provider, patient was medically cleared for admission to [facility] at [facility].  This note serves as an admission medical H&P.    Denies any medical complaints at this time.    Past Medical History:        Diagnosis Date    Anxiety     Depression     History of IBS        Past Surgical History:    History reviewed. No pertinent surgical history.    Medications Prior to Admission:    Prior to Admission medications    Medication Sig Start Date End Date Taking? Authorizing Provider   escitalopram (LEXAPRO) 10 MG tablet Take 1 tablet by mouth daily 12/18/23   Margaret Liu DO   pantoprazole (PROTONIX) 40 MG tablet Take 1 tablet by mouth daily 12/18/23   Margaret Liu DO   dicyclomine (BENTYL) 20 MG tablet Take 1 tablet by mouth 9/7/23   ProviderAmado MD       Allergies:  Penicillin g    Social History:    Tobacco use: denies  ETOH use: denies  Illicit drug use: denies    Family History:   Positive as follows:        Problem Relation Age of Onset    Diabetes Paternal Grandmother        REVIEW OF SYSTEMS:       Constitutional: Negative for fever   HENT: Negative for sore throat   Eyes: Negative for redness   Respiratory: Negative  for dyspnea, cough   Cardiovascular: Negative for chest pain   Gastrointestinal: Negative for vomiting, diarrhea   Genitourinary: Negative for hematuria   Musculoskeletal:

## 2024-01-22 NOTE — ED NOTES
Presenting Problem: Patient presents to ED on a SOB after having increased anxiety and paranoid thoughts/delusions. Per SOB pt states \" I am a reincarnation of God\". Per SOB pt id having paranoid thoughts and delusions of being God. Pt states \" now that I told you I don't need anything else\".   Pt states he has been having an increase in anxiety and states he has not been able to sleep. He reports he is not currently suicidal and denies plan and intent. Pt reports he has had suicidal thoughts in the past but has not acted on them. Pt reports he has been having auditory hallucinations. Pt states he has been having visual hallucinations and states \" If I showed you, you would not be able to see them, they are terrifying\".  Pt reports he is God inside of [employer] body. Pt states he is a different version of Jacob Artis.      Appearance/Hygiene:  well-appearing, hospital attire, in chair, good grooming, and good hygiene   Motor Behavior: WNL   Attitude: cooperative  Affect: elevated affect   Speech: normal pitch and normal volume  Mood: euphoric and labile   Thought Processes: Illogical and grandiose   Perceptions: Present - Pt reports he is hearing voices telling him he is stupid, don't know what he is doing, and is the dumbest person in the universe. Pt states he is God. Pt states he see's things and states if he were to show this writer what he saw this writer would not be able to see them.    Thought content: paranoid, delusions,  Orientation: A&Ox4   Memory: intact  Concentration: Fair    Insight/ judgement: impaired judgment and insight       Psychosocial and contextual factors: Pt states he lives with a roommate. Pt's friends parents brought him into the ED. Pt's parents are not involved in his much. Pt reports he has IBS. Pt reports he has multiple people he can go to for support. Pt reports his appetite has remained normal for him and states his sleep is poor. Pt reports he has hx of suicidal thoughts

## 2024-01-22 NOTE — PROGRESS NOTES
4 Eyes Skin Assessment     The patient is being assessed for  Admission    I agree that 2 RN's have performed a thorough Head to Toe Skin Assessment on the patient. ALL assessment sites listed below have been assessed.       Areas assessed for pressure by both nurses:   [x]   Head, Face, and Ears   [x]   Shoulders, Back, and Chest  [x]   Arms, Elbows, and Hands   [x]   Coccyx, Sacrum, and Ischum  [x]   Legs, Feet, and Heels    Physical assessment done, no fresh injuries nor wound noted.                                 All Mepilex Borders were peeled back and area peeked at by both nurses:  No:    Please list where Mepilex Borders are located:                   Does the Patient have Skin Breakdown related to pressure?  No              Cody Prevention initiated:  No   Wound Care Orders initiated:  No      [facility] nurse consulted for Pressure Injury (Stage 3,4, Unstageable, DTI, NWPT, Complex wounds)and New or Established Ostomies:  No        Nurse 1 eSignature: Electronically signed by Todd Todd RN on 1/22/24 at 3:58 AM EST    **SHARE this note so that the co-signing nurse is able to place an eSignature**    Nurse 2 eSignature: Electronically signed by Dia Stuart RN on 1/22/24 at 5:26 AM EST

## 2024-01-22 NOTE — H&P
INITIAL PSYCHIATRIC HISTORY AND PHYSICAL      Patient name: Dawson Emanuel  Admit date: 1/21/2024  Today's date: 1/22/2024           CC:  Psychosis    HPI:   Patient seen in room on Adult Behavioral Unit.   Patient is a 18 y.o. male who presents   to ED on a SOB after having increased anxiety and paranoid thoughts/delusions. Per SOB pt states \" I am a reincarnation of God\". Per SOB pt id having paranoid thoughts and delusions of being God. Pt states \" now that I told you I don't need anything else\".   notes:  \"Pt states he has been having an increase in anxiety and states he has not been able to sleep. He reports he is not currently suicidal and denies plan and intent. Pt reports he has had suicidal thoughts in the past but has not acted on them. Pt reports he has been having auditory hallucinations. Pt states he has been having visual hallucinations and states \" If I showed you, you would not be able to see them, they are terrifying\".  Pt reports he is God inside of Dawson Gonzales body. Pt states he is a different version of Jacob Chandra.\"     Pt now on BHI, resting in bed.  Pt withdrawn, guarded.  Poor eye contact, did not open his eyes in our conversation.  He describes having increased anxiety, to the point that his \"words and emotions were no longer matching\", stating it is really hard to describe, but he felt that he had no control of his body any longer.  Pt was asked about the presence of demons or gods and he states that he had a \"feeling that something evil was close\", but then states he did not want to talk about that.  Pt reports poor sleep prior to his admission, able to sleep last night after medication, and feels \"ok\" today.  Pt states that there was no real trigger or stressor that caused his increased anxiety.  Pt could not tell me how long he has felt this way, stating \"I am having trouble remembering anything right now\".  Pt appeared confused and disorganized at times, irritable as our

## 2024-01-22 NOTE — PROGRESS NOTES
Behavioral Services  Medicare Certification Upon Admission    I certify that this patient's inpatient psychiatric hospital admission is medically necessary for:    [x] (1) Treatment which could reasonably be expected to improve this patient's condition,       [x] (2) Or for diagnostic study;     AND     [x](2) The inpatient psychiatric services are provided while the individual is under the care of a physician and are included in the individualized plan of care.    Estimated length of stay/service 2-5 days    Plan for post-hospital care outpatient services    Electronically signed by BHARAT Garcia CNP on 1/22/2024 at 8:53 AM

## 2024-01-22 NOTE — ED NOTES
RN obtained nasal swab and sent to lab without difficulty.  Pt family friend at bedside and patient already sleeping again.  Pt is resting again with eyes closed.  Will obtain vitals when RN needs to wake him up again.  Chiki murphy

## 2024-01-22 NOTE — PROGRESS NOTES
Home Medication Reconciliation Status          [x] COMPLETE       Medication history has been reviewed and obtained from the following source(s):       [x] patient/family verbal report             [] patient/family provided written list       [] external pharmacy   [] external facility list         []  Provider notified that home medication reconciliation is complete          [] IN PROGRESS       Medication reconciliation marked in progress at this time due to:       [] patient/family poor historian      [] waiting arrival of family to clarify       [] waiting for accurate list        [] external pharmacy needs called      * Follow up is needed.          [] UNABLE TO ASSESS       Medication reconciliation is incomplete and unable to assess at this time due to:       [] critical patient condition   [] patient is unresponsive        [] no family available                       [] unknown pharmacy       [] anonymous patient          * Follow up is needed.      [] Pharmacy consult placed for medication reconciliation assistance   Additional comments:

## 2024-01-23 PROCEDURE — 99232 SBSQ HOSP IP/OBS MODERATE 35: CPT

## 2024-01-23 PROCEDURE — 6370000000 HC RX 637 (ALT 250 FOR IP): Performed by: PSYCHIATRY & NEUROLOGY

## 2024-01-23 PROCEDURE — 6370000000 HC RX 637 (ALT 250 FOR IP)

## 2024-01-23 PROCEDURE — 6360000002 HC RX W HCPCS: Performed by: NURSE PRACTITIONER

## 2024-01-23 PROCEDURE — 1240000000 HC EMOTIONAL WELLNESS R&B

## 2024-01-23 RX ORDER — DIPHENHYDRAMINE HYDROCHLORIDE 50 MG/ML
50 INJECTION INTRAMUSCULAR; INTRAVENOUS ONCE
Status: COMPLETED | OUTPATIENT
Start: 2024-01-23 | End: 2024-01-23

## 2024-01-23 RX ORDER — HALOPERIDOL 5 MG/ML
5 INJECTION INTRAMUSCULAR ONCE
Status: COMPLETED | OUTPATIENT
Start: 2024-01-23 | End: 2024-01-23

## 2024-01-23 RX ORDER — ARIPIPRAZOLE 10 MG/1
10 TABLET ORAL DAILY
Status: DISCONTINUED | OUTPATIENT
Start: 2024-01-24 | End: 2024-01-26 | Stop reason: HOSPADM

## 2024-01-23 RX ADMIN — DIPHENHYDRAMINE HYDROCHLORIDE 50 MG: 50 INJECTION INTRAMUSCULAR; INTRAVENOUS at 18:32

## 2024-01-23 RX ADMIN — OLANZAPINE 10 MG: 10 TABLET, FILM COATED ORAL at 16:32

## 2024-01-23 RX ADMIN — HYDROXYZINE HYDROCHLORIDE 50 MG: 50 TABLET, FILM COATED ORAL at 22:04

## 2024-01-23 RX ADMIN — OLANZAPINE 10 MG: 10 TABLET, FILM COATED ORAL at 22:04

## 2024-01-23 RX ADMIN — PANTOPRAZOLE SODIUM 40 MG: 40 TABLET, DELAYED RELEASE ORAL at 08:27

## 2024-01-23 RX ADMIN — TRAZODONE HYDROCHLORIDE 50 MG: 50 TABLET ORAL at 20:56

## 2024-01-23 RX ADMIN — HALOPERIDOL LACTATE 5 MG: 5 INJECTION, SOLUTION INTRAMUSCULAR at 18:33

## 2024-01-23 RX ADMIN — LORAZEPAM 2 MG: 2 TABLET ORAL at 12:03

## 2024-01-23 RX ADMIN — HYDROXYZINE HYDROCHLORIDE 50 MG: 50 TABLET, FILM COATED ORAL at 07:35

## 2024-01-23 RX ADMIN — ARIPIPRAZOLE 5 MG: 10 TABLET ORAL at 08:27

## 2024-01-23 RX ADMIN — ESCITALOPRAM OXALATE 10 MG: 10 TABLET ORAL at 08:27
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
## 2024-01-23 NOTE — DISCHARGE INSTRUCTIONS
Advanced Directives:  Patient does not have a surrogate decision maker appointed   Name (if yes): N/A   Phone Number: N/A  Patient does not have a psychiatric and/ or medical advanced directive or power of .   Patient was not offered psychiatric and/ or medical advanced directive or power of  information/completion but declined to complete   Why not? Not Clinically Appropriate.     Discharge Planning is Complete.    Discharge Date: 01/26/2024  Reason for Hospitalization: A 18 y.o. male presented to the ED with increased anxiety and paranoid thoughts/delusions stating he is the reincarnation of God. Precipitant is unknown.   Discharge Diagnosis: Psychosis, unspecified psychosis type   Discharging to: Private Residence     Your instructions:  Your physician here was Laz Dewitt MD. If you have any questions please call the unit at [phone] for the adult unit or [phone] for [facility].    Please note that we have a patient family advisory [place] that meets the second Wednesday of January and the second Wednesday of July at 5pm in [facility]. Department leadership would love for you to attend to give feedback on what we are doing well and areas in which we can improve our patient care. Please come if you are able and feel free to reach out to Behavioral [facility] at [phone] with any questions.     The crisis number for [facility] is [phone] (SAVE). This crisis line is available 24 hours a day, seven days a week.      Please follow up with your PCP regarding any pending labs.     Your next appointment is:  Name of Provider: Margaret Liu DO   Provider specialty/license: Family Medicine    Date and time of appointment: Monday, January 29, 2024 1:30 pm   The type/s of services requested are: Hospital Follow- Up   [facility]  Address: [address], [address], [place] [address]  Phone  Number: [phone]  Special instructions (what to bring to appointment, etc.): Please bring a photo ID, insurance card, current medications/medication list, and co-pay if you have one.      Other appointments:   Name of Provider: RIA Garcia  Provider specialty/license: Psychiatry   Date and time of appointment: Friday, February 9, 2024 at 11:30 am (arrive 15 mins early to register)  The type/s of services requested are: Medication Management  Agency Name: [facility]  Address: [address] [place] [address] [place] [address]  Phone Number: [phone]  Special instructions (what to bring to appointment, etc.): You will need to go to registration at [facility] entrance prior to this appointment. Once you have registered, staff will direct you to the program.       Name of Provider: NIDHI Hook   Provider specialty/license: Psychiatry/Mental Health   Date and time of appointment: Wednesday, February 28, 2024 at 1:00 pm  The type/s of services requested are: Assessment & Treatment  Agency Name: [facility]  Location: [address] [place] [address]    Phone: [phone]  Special instructions (what to bring to appointment, etc.): Please bring a photo ID, insurance card, current medications/medication list, and co-pay if you have one.      Name of Provider: Dr. Camejo   Provider specialty/license: Psychiatry/Mental Health   Date and time of appointment: Wednesday, February 28, 2024 at 3:30 pm  The type/s of services requested are: Assessment & Treatment  Agency Name: [facility]  Location: [address] [place] [address]    Phone: [phone]  Special instructions (what to bring to appointment, etc.): Please bring a photo ID, insurance card, current medications/medication list, and co-pay if you have one.         Discharge Completed By: PETRA Gaspar  Fax to:   [facility]: Sent via [employer]

## 2024-01-23 NOTE — PLAN OF CARE
Problem: Anxiety  Goal: Will report anxiety at manageable levels  Description: INTERVENTIONS:  1. Administer medication as ordered  2. Teach and rehearse alternative coping skills  3. Provide emotional support with 1:1 interaction with staff  Outcome: Progressing     Pt. Denies all. After speaking with NP he was agitated he can't leave today. States he can't sleep here because of his anxiety. Anxiety this AM, hydroxyzine given. Pt. Agitated and anxious about not leaving, but still remained pleasant. Ativan given, pt. Resting in his room at this time. Denies wanting to come out of room and socialize.
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
## 2024-01-23 NOTE — PLAN OF CARE
Problem: Risk for Elopement  Goal: Patient will not exit the unit/facility without proper excort  Outcome: Progressing     Problem: Anxiety  Goal: Will report anxiety at manageable levels  Description: INTERVENTIONS:  1. Administer medication as ordered  2. Teach and rehearse alternative coping skills  3. Provide emotional support with 1:1 interaction with staff  Outcome: Not Progressing     Problem: Confusion  Goal: Confusion, delirium, dementia, or psychosis is improved or at baseline  Description: INTERVENTIONS:  1. Assess for possible contributors to thought disturbance, including medications, impaired vision or hearing, underlying metabolic abnormalities, dehydration, psychiatric diagnoses, and notify attending LIP  2. [place] high risk fall precautions, as indicated  3. Provide frequent short contacts to provide reality reorientation, refocusing and direction  4. Decrease environmental stimuli, including noise as appropriate  5. Monitor and intervene to maintain adequate nutrition, hydration, elimination, sleep and activity  6. If unable to ensure safety without constant attention obtain sitter and review sitter guidelines with assigned personnel  7. Initiate Psychosocial CNS and Spiritual Care consult, as indicated  Outcome: Not Progressing

## 2024-01-23 NOTE — PROGRESS NOTES
Visual hallucinations, patient seeing things in the bathroom, talking into the bathroom when nothing is there. Stating there is pixels that are following him that outline a cat, pt. Touched the air and said he felt the static off the cat. Sol asked pt. If he was okay when doing rounds and stated he was not okay and came running out of him room saying someone was in the bathroom and was going to get him. Pt. Brought out on the unit to decrease hallucinations but they continued. Gave all PO medications that were ordered, call to MATTHEW Cobb NP. Haldol 5mg and 50mg Benadryl given IM left and R deltoid. Pt. Cooperative. Encouraged to lay down, unsteady on his feet after. Pt. Continues to have hallucinations at this time.
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
## 2024-01-23 NOTE — PROGRESS NOTES
Pt. Came to the desk expressing agitation due to not being able to leave and felt like he was about to have a panic attack. Pt. Stated he does not need help, he isnt getting any sleep which is making him worse. Denies wanting to harm self or others. No AVH. Pt. Still is cooperative and apologetic. Ativan 2mg PO given at 1204 for agitation/anxiety.

## 2024-01-23 NOTE — PROGRESS NOTES
Patient awake & came out to the team station & asked if we were talking about  his caresource card & was instructed that staff was not talking about him. Patient was returned to his room. Myesha Zuñiga R.N.

## 2024-01-23 NOTE — PROGRESS NOTES
Pt. Presents with auditory hallucinations. Hearing voices saying his name and growling noises. He does then deny them. He is agitated and stated if he doesn't get to get out of here he may snap, but does not want to hurt others or himself, but it is aggravating to be held here when nothing is wrong with him. Pt. Still pleasant to talk to but appears anxious and agitated. Zyprexa 10mg PO given at 1632.

## 2024-01-23 NOTE — PROGRESS NOTES
Group Therapy Note    Date: 1/22/2024  Start Time: 20:00  End Time:  21:00  Number of Participants: 12    Type of Group: Recreational  wrap up    Wellness Binder Information  Module Name:  /  Session Number:  /    Patient's Goal:  coping skills    Notes:  continuing to work on goal    Status After Intervention:  Unchanged    Participation Level: Active Listener and Interactive    Participation Quality: Appropriate, Attentive, and Sharing      Speech:  pressured      Thought Process/Content: Perseverating      Affective Functioning: Blunted      Mood: anxious      Level of consciousness:  Alert and Attentive      Response to Learning: Able to change behavior      Endings: None Reported    Modes of Intervention: Socialization and Problem-solving      Discipline Responsible: Behavorial Health Tech      Signature:  Jatin Vidal

## 2024-01-24 PROCEDURE — 6370000000 HC RX 637 (ALT 250 FOR IP)

## 2024-01-24 PROCEDURE — 1240000000 HC EMOTIONAL WELLNESS R&B

## 2024-01-24 PROCEDURE — 6370000000 HC RX 637 (ALT 250 FOR IP): Performed by: PSYCHIATRY & NEUROLOGY

## 2024-01-24 PROCEDURE — 99232 SBSQ HOSP IP/OBS MODERATE 35: CPT

## 2024-01-24 RX ORDER — DIVALPROEX SODIUM 500 MG/1
1000 TABLET, EXTENDED RELEASE ORAL DAILY
Status: DISCONTINUED | OUTPATIENT
Start: 2024-01-24 | End: 2024-01-25

## 2024-01-24 RX ORDER — LORAZEPAM 2 MG/1
2 TABLET ORAL
Status: COMPLETED | OUTPATIENT
Start: 2024-01-24 | End: 2024-01-24

## 2024-01-24 RX ADMIN — ARIPIPRAZOLE 10 MG: 10 TABLET ORAL at 09:27

## 2024-01-24 RX ADMIN — TRAZODONE HYDROCHLORIDE 50 MG: 50 TABLET ORAL at 20:44

## 2024-01-24 RX ADMIN — DIVALPROEX SODIUM 1000 MG: 500 TABLET, FILM COATED, EXTENDED RELEASE ORAL at 10:49

## 2024-01-24 RX ADMIN — HYDROXYZINE HYDROCHLORIDE 50 MG: 50 TABLET, FILM COATED ORAL at 15:34

## 2024-01-24 RX ADMIN — OLANZAPINE 10 MG: 10 TABLET, FILM COATED ORAL at 10:49

## 2024-01-24 RX ADMIN — PANTOPRAZOLE SODIUM 40 MG: 40 TABLET, DELAYED RELEASE ORAL at 09:27

## 2024-01-24 RX ADMIN — LORAZEPAM 2 MG: 2 TABLET ORAL at 23:22

## 2024-01-24 RX ADMIN — ESCITALOPRAM OXALATE 10 MG: 10 TABLET ORAL at 09:27

## 2024-01-24 RX ADMIN — HYDROXYZINE HYDROCHLORIDE 50 MG: 50 TABLET, FILM COATED ORAL at 21:24

## 2024-01-24 RX ADMIN — OLANZAPINE 10 MG: 10 TABLET, FILM COATED ORAL at 15:34

## 2024-01-24 NOTE — PROGRESS NOTES
Patient having increased anxiety and agitation after speaking with provider.  He did request medication.  Zyprexa offered and he was acceptable.  See MAR

## 2024-01-24 NOTE — PROGRESS NOTES
Writer sitting with pt. In room, with relaxing music to decrease stimulation and promote rest. Pt. Having auditory and visually hallucinations. Pt. Medicated and unsteady on his feet. Pt. Very impulsive grabbing stuff next to his bed there is not there resulting in almost rolling off the bed. Pt. C/o cramping in his legs, rolling around in the bed. Writer staying close by due to impulsive behavior and to prevent falls. Pt. Talking loose association not able to make sense of conversation. Pt. Stating he is scared of the man in the room that he sees but no one present/ Pt. Swinging his arms in the air as he is punching someone and cussing at hallucination. Due to impulsive behavior and current high fall risk, pt placed on 1:1 for safety. Call to MATTHEW Cobb for 1:1. Call placed to notify clinical.

## 2024-01-24 NOTE — PLAN OF CARE
Problem: Anxiety  Goal: Will report anxiety at manageable levels  Description: INTERVENTIONS:  1. Administer medication as ordered  2. Teach and rehearse alternative coping skills  3. Provide emotional support with 1:1 interaction with staff  1/24/2024 0049 by Myesha Zuñiga RN  Outcome: Progressing  1/23/2024 1254 by Bushra Thorne LPN  Outcome: Progressing     Problem: Confusion  Goal: Confusion, delirium, dementia, or psychosis is improved or at baseline  Description: INTERVENTIONS:  1. Assess for possible contributors to thought disturbance, including medications, impaired vision or hearing, underlying metabolic abnormalities, dehydration, psychiatric diagnoses, and notify attending LIP  2. [place] high risk fall precautions, as indicated  3. Provide frequent short contacts to provide reality reorientation, refocusing and direction  4. Decrease environmental stimuli, including noise as appropriate  5. Monitor and intervene to maintain adequate nutrition, hydration, elimination, sleep and activity  6. If unable to ensure safety without constant attention obtain sitter and review sitter guidelines with assigned personnel  7. Initiate Psychosocial CNS and Spiritual Care consult, as indicated  Outcome: Progressing     Problem: Risk for Elopement  Goal: Patient will not exit the unit/facility without proper excort  Outcome: Progressing   Patient has been restless, anxious, talking mostly nonsensical & when not talking nonsensical, thoughts were lose & disorganized. Patient has had a 1:1 sitter all shift for safety. Patient was out with patient group, early in the shift & did eat a sandwich & chips. Patient's gait was somewhat unsteady at the beginning of the shift & patient appeared sedated. At 21:30, patient per sitter, KAYLEE Valadez, patient stated seeing spiders on the floor. Patient jumped out of bed & was jumping up & down as if spiders were on him. At 22:00, per KAYLEE Flores, Patient flipped  around on his bed & stated that a lorelei bar melting under him. Lisha attempted to reassure patient that what he was seeing wasn't real.  Patient had also been observed by Lisha mao, having a conversation with someone with his eyes closed & was talking nonsensical. Patient appeared calmer at 22:30, after taking zyprexa & atarax at 22:04. Patient to the bathroom & voided at 00:15. Patient has appeared asleep at 00:45 & continues asleep. Myseha Zuñiga R.N.

## 2024-01-24 NOTE — PROGRESS NOTES
Patient continues awake & sitting in the day room at this time. 1:1 sitter for safety. Patient continues restless & anxious. Patient was given trazodone 50 mg po for sleep. Myesha Zuñiga R.N.

## 2024-01-24 NOTE — PLAN OF CARE
Problem: Anxiety  Goal: Will report anxiety at manageable levels  Description: INTERVENTIONS:  1. Administer medication as ordered  2. Teach and rehearse alternative coping skills  3. Provide emotional support with 1:1 interaction with staff  1/24/2024 1353 by Lorena Woodard RN  Outcome: Progressing     Problem: Confusion  Goal: Confusion, delirium, dementia, or psychosis is improved or at baseline  Description: INTERVENTIONS:  1. Assess for possible contributors to thought disturbance, including medications, impaired vision or hearing, underlying metabolic abnormalities, dehydration, psychiatric diagnoses, and notify attending LIP  2. [place] high risk fall precautions, as indicated  3. Provide frequent short contacts to provide reality reorientation, refocusing and direction  4. Decrease environmental stimuli, including noise as appropriate  5. Monitor and intervene to maintain adequate nutrition, hydration, elimination, sleep and activity  6. If unable to ensure safety without constant attention obtain sitter and review sitter guidelines with assigned personnel  7. Initiate Psychosocial CNS and Spiritual Care consult, as indicated  1/24/2024 1353 by Lorena Woodard RN  Outcome: Progressing     Problem: Risk for Elopement  Goal: Patient will not exit the unit/facility without proper excort  1/24/2024 1353 by Lorena Woodard RN  Outcome: Progressing  Patient was visible on unit, isolated to self.  Medication complaint with encouragement.  Unable to attend groups due to remaining paranoid.  Patient was cooperative with interview.  Denies SI/HI.  Denies any AH/VH this shift.  Patient continues with pressured speech, FOI, unable to stay on topic. Mood labile, will go from calm, to tearful, to stating he is frustrated for not listening to him.  He was reoriented to place and time this shift and able to recall those.  Disoriented to situation.  Patient states he is in a constant state of high anxiety  which causes him to lose his train of thought.  He will say that he feels safe here but not enough to be able to sleep.  He was educated on medication regimen and was agreeable and took his medications but then later stated he did not need medications. He has been able to  verbalize his needs to staff and has been cooperative with all care.

## 2024-01-24 NOTE — PROGRESS NOTES
Patient became increasingly agitated and anxious during visitation with his parents.  Patient continues to ask to go home.  He became tearful and placed himself on the ground at the nurses station.  Crying and yelling and placed the blanket over his head after being told  that he could not be discharged today.  Patient able stating, \"this is making my anxiety worse, no one is listening to me, if I don't get out I will become very angry!\"  Patient was educated on the process of discharge.  He continues with FOI, and is labile.  Patient offered medication and he was agreeable.  Patient requesting same medication he was given earlier this morning.  Patient was able to be redirected and verbalized understanding on discharge process.  Zyprexa given for agitation and Hydroxyzine given for anxiety.  See MAR

## 2024-01-24 NOTE — PROGRESS NOTES
Department of Psychiatry  AttendingProgress Note  Chief Complaint: Psychosis    Patient's chart was reviewed and collaborated with  about the treatment plan.    SUBJECTIVE:    Pt sitting in room today, 1:1 overnight for safety.  Pt was RTIS, seeing objects like spiders, people, and chocolate in his room/bed.  Pt alert today, states that he knows he can talk to me because he trusts me.  Pt then looks at me nervously and states he does not trust me and this will probably lead to him staying longer.  Pt with FOI, pressured/anxious speech.  Pt states that he was seeing things because he was not sleeping, then tells me he has slept fine since admission.  Pt called me back to his room later to tell me that there are two people living in his body and he knows that I have known this, just needed him to say it out loud.  Pt referring to himself in the third person throughout our conversation.  More paranoid and disorganized today.  Able to remove 1:1 as pt has been up and moving without being unsteady.      Plan  1/22: Start Abilify 5mg daily  1/23: Increase Abilify to 10mg daily  1/24: Add Depakote ER 1000mg daily    Patient is feeling better. Suicidal ideation:  denies suicidal ideation.  Patient does not have medication side effects.    ROS: Patient has new complaints: no  Sleeping adequately:  Yes   Appetite adequate: Yes  Attending groups: No:   Visitors:No    OBJECTIVE    Physical  VITALS:  /76   Pulse 80   Temp 97.6 °F (36.4 °C) (Oral)   Resp 16   Ht 1.753 m (5' 9\")   Wt 68 kg (150 lb)   SpO2 98%   BMI 22.15 kg/m²     Mental Status Examination:  Patients appearance was ill-appearing, hospital attire, and lying in bed. Thoughts are Flight of ideas. Homicidal ideations none.  No abnormal movements, tics or mannerisms.  Memory intact Aims 0. Concentration Fair.   Alert and oriented X 4. Insight and Judgement impaired insight. Patient was cooperative. Patient gait normal. Mood constricted, affect  intended for the qualitative detection of nucleic  acids from SARS-CoV-2, influenza A, and influenza B in nasopharyngeal  and nasal swab specimens for use under the FDA’s Emergency Use  Authorization (EUA) only.    Patient Fact Sheet:  https://www.fda.gov/media/320514/download  Provider Fact Sheet: https://www.fda.gov/media/686314/download  EUA: https://www.fda.gov/media/304373/download  IFU: https://www.fda.gov/media/888518/download    Methodology:  RT-PCR      INFLUENZA A 01/21/2024 NOT DETECTED  NOT DETECTED Final    INFLUENZA B 01/21/2024 NOT DETECTED  NOT DETECTED Final            Medications  Current Facility-Administered Medications: divalproex (DEPAKOTE ER) extended release tablet 1,000 mg, 1,000 mg, Oral, Daily  ARIPiprazole (ABILIFY) tablet 10 mg, 10 mg, Oral, Daily  dicyclomine (BENTYL) tablet 20 mg, 20 mg, Oral, 4x Daily PRN  escitalopram (LEXAPRO) tablet 10 mg, 10 mg, Oral, Daily  pantoprazole (PROTONIX) tablet 40 mg, 40 mg, Oral, Daily  acetaminophen (TYLENOL) tablet 650 mg, 650 mg, Oral, Q8H PRN  hydrOXYzine HCl (ATARAX) tablet 50 mg, 50 mg, Oral, TID PRN  traZODone (DESYREL) tablet 50 mg, 50 mg, Oral, Nightly PRN  nicotine polacrilex (COMMIT) lozenge 2 mg, 2 mg, Oral, Q1H PRN  OLANZapine (ZYPREXA) tablet 10 mg, 10 mg, Oral, BID PRN    ASSESSMENT AND PLAN    Principal Problem:    Psychosis, unspecified psychosis type (HCC)  Active Problems:    Cannabis use, unspecified, uncomplicated  Resolved Problems:    * No resolved hospital problems. *       1.Patient's symptoms  showed no change  2.Probable discharge is this week  3.Discharge planning is incomplete  4. Suicidal ideation is better  5. Total time with patient was 40 minutes and more than 50 % of that time was spent counseling the patient on their symptoms, treatment and expected goals.     Rajni Teresa, ANNETTE-BC

## 2024-01-24 NOTE — PROGRESS NOTES
After taking trazodone at 20:56, patient continues awake in bed with 1:1 sitter for safety. Patient has been fearful & having visual hallucinations & currently is upset over seeing spiders crawling on the floor. Patient was given atarax 50 mg po for anxiety & zyprexa 10 mg po for agitation. Myesha Zuñiga R.N.

## 2024-01-25 PROCEDURE — 6370000000 HC RX 637 (ALT 250 FOR IP)

## 2024-01-25 PROCEDURE — 1240000000 HC EMOTIONAL WELLNESS R&B

## 2024-01-25 PROCEDURE — 99232 SBSQ HOSP IP/OBS MODERATE 35: CPT

## 2024-01-25 PROCEDURE — 6370000000 HC RX 637 (ALT 250 FOR IP): Performed by: PSYCHIATRY & NEUROLOGY

## 2024-01-25 RX ORDER — DIVALPROEX SODIUM 125 MG/1
250 CAPSULE, COATED PELLETS ORAL EVERY 12 HOURS SCHEDULED
Status: DISCONTINUED | OUTPATIENT
Start: 2024-01-26 | End: 2024-01-26 | Stop reason: HOSPADM

## 2024-01-25 RX ADMIN — ESCITALOPRAM OXALATE 10 MG: 10 TABLET ORAL at 10:07

## 2024-01-25 RX ADMIN — OLANZAPINE 10 MG: 10 TABLET, FILM COATED ORAL at 19:41

## 2024-01-25 RX ADMIN — DIVALPROEX SODIUM 1000 MG: 500 TABLET, FILM COATED, EXTENDED RELEASE ORAL at 10:07

## 2024-01-25 RX ADMIN — HYDROXYZINE HYDROCHLORIDE 50 MG: 50 TABLET, FILM COATED ORAL at 19:42

## 2024-01-25 RX ADMIN — PANTOPRAZOLE SODIUM 40 MG: 40 TABLET, DELAYED RELEASE ORAL at 10:07

## 2024-01-25 RX ADMIN — ARIPIPRAZOLE 10 MG: 10 TABLET ORAL at 10:07

## 2024-01-25 RX ADMIN — NICOTINE POLACRILEX 2 MG: 2 LOZENGE ORAL at 23:38

## 2024-01-25 RX ADMIN — TRAZODONE HYDROCHLORIDE 50 MG: 50 TABLET ORAL at 19:42

## 2024-01-25 NOTE — PLAN OF CARE
Problem: Anxiety  Goal: Will report anxiety at manageable levels  Description: INTERVENTIONS:  1. Administer medication as ordered  2. Teach and rehearse alternative coping skills  3. Provide emotional support with 1:1 interaction with staff  Outcome: Not Progressing     Problem: Confusion  Goal: Confusion, delirium, dementia, or psychosis is improved or at baseline  Description: INTERVENTIONS:  1. Assess for possible contributors to thought disturbance, including medications, impaired vision or hearing, underlying metabolic abnormalities, dehydration, psychiatric diagnoses, and notify attending LIP  2. [place] high risk fall precautions, as indicated  3. Provide frequent short contacts to provide reality reorientation, refocusing and direction  4. Decrease environmental stimuli, including noise as appropriate  5. Monitor and intervene to maintain adequate nutrition, hydration, elimination, sleep and activity  6. If unable to ensure safety without constant attention obtain sitter and review sitter guidelines with assigned personnel  7. Initiate Psychosocial CNS and Spiritual Care consult, as indicated  Outcome: Not Progressing   Pt seems anxious a lot of the time. Pt wanting to rest, but afraid of being alone, afraid of the dark; Pt had Trazodone 50 mg po at 2044.  Medication no effective. Atarax 50 mg po at 2124.  Did  not seem to be effective. Dr Dewitt ordered a one time dose to Ativan 2 mg po given at 2312.  Pt had his mattress in the day room. Pt was to afraid of the dark to be in his room with lights out. Also said couldn't sleep with lights on. However hd did go to sleep around 0100. Pt remains asleep at this time.  Pt was very overwhelmed with his Bahai delusions. He talked abo ut God loving everyone and having a special relationship with God and Jacob. Stated they depend on him here on [place] and that right now he is the most intelligent person on the planet. Pt anxious and afraid at times.  Pt is  however, cooperative and pleasant. Recently he whispers quite a great deal. Pt currently is resting well. Pt shows no signs of looking to elope.  He doesn't seem to be watching staff/doors, etc.

## 2024-01-25 NOTE — PROGRESS NOTES
Department of Psychiatry  AttendingProgress Note  Chief Complaint: Psychosis    Patient's chart was reviewed and collaborated with  about the treatment plan.    SUBJECTIVE:    Pt up in common area today, moods less labile, answering questions appropriately.  Pt did become tangential the more he spoke, stopping to apologize and states that he forgot what he was trying to say.  Pt was calm enough to go over to the large side today, attending groups and sitting with nursing students.  Pt also visited with family the day prior.  Pt states his sleep has improved, moods becoming more stable.  Better insight    Plan  1/22: Start Abilify 5mg daily  1/23: Increase Abilify to 10mg daily  1/24: Add Depakote ER 1000mg daily  1/25:  Depakote changed to Sprinkles 250mg BID     Patient is feeling better. Suicidal ideation:  denies suicidal ideation.  Patient does not have medication side effects.    ROS: Patient has new complaints: no  Sleeping adequately:  Yes   Appetite adequate: Yes  Attending groups: No:   Visitors:No    OBJECTIVE    Physical  VITALS:  BP (!) 156/84   Pulse (!) 101   Temp 98.1 °F (36.7 °C) (Temporal)   Resp 16   Ht 1.753 m (5' 9\")   Wt 68 kg (150 lb)   SpO2 99%   BMI 22.15 kg/m²     Mental Status Examination:  Patients appearance was ill-appearing, hospital attire, and lying in bed. Thoughts are Flight of ideas. Homicidal ideations none.  No abnormal movements, tics or mannerisms.  Memory intact Aims 0. Concentration Fair.   Alert and oriented X 4. Insight and Judgement impaired insight. Patient was cooperative. Patient gait normal. Mood constricted, affect flat affect Hallucinations Absent, suicidal ideations no specific plan to harm self Speech pressured    Data  Labs:   Admission on 01/21/2024   Component Date Value Ref Range Status    WBC 01/21/2024 7.0  4.0 - 11.0 K/uL Final    RBC 01/21/2024 5.29  4.20 - 5.90 M/uL Final    Hemoglobin 01/21/2024 15.3  13.5 - 17.5 g/dL Final     Metabolite Screen, Urine 01/21/2024 Neg  Negative <300 ng/mL Final    Opiate Scrn, Ur 01/21/2024 Neg  Negative <300 ng/mL Final    Comment: \"Therapeutic levels of pain medication, especially oxycontin and synthetic  opioids, may not be detected by this Methodology. Pain management screen  panel  Drug panel-PM-Hi Res Ur, Interp (PAIN) should be considered for drug  monitoring \".      PCP Screen, Urine 01/21/2024 Neg  Negative <25 ng/mL Final    Methadone Screen, Urine 01/21/2024 Neg  Negative <300 ng/mL Final    Oxycodone Urine 01/21/2024 Neg  Negative <100 ng/ml Final    FENTANYL SCREEN, URINE 01/21/2024 Neg  Negative <5 ng/mL Final    pH, UA 01/21/2024 6.0   Final    Comment: Urine pH less than 5.0 or greater than 8.0 may indicate sample adulteration.  Another sample should be collected if clinically  indicated.      Drug Screen Comment: 01/21/2024 see below   Final    Comment: This method is a screening test to detect only these drug  classes as part of a medical workup.  Confirmatory testing  by another method should be ordered if clinically indicated.      SARS-CoV-2 RNA, RT PCR 01/21/2024 NOT DETECTED  NOT DETECTED Final    Comment: Not Detected results do not preclude SARS-CoV-2 infection and  should not be used as the sole basis for patient management  decisions.  Results must be combined with clinical observations,  patient history, and epidemiological information.  Testing was performed using TRU JESSA SARS-CoV-2 and Influenza A/B  nucleic acid assay. This test is a multiplex Real-Time Reverse  Transcriptase Polymerase Chain Reaction (RT-PCR)-based in vitro  diagnostic test intended for the qualitative detection of nucleic  acids from SARS-CoV-2, influenza A, and influenza B in nasopharyngeal  and nasal swab specimens for use under the FDA’s Emergency Use  Authorization (EUA) only.    Patient Fact Sheet:  https://www.fda.gov/media/109530/download  Provider Fact Sheet:

## 2024-01-25 NOTE — PLAN OF CARE
Problem: Anxiety  Goal: Will report anxiety at manageable levels  Description: INTERVENTIONS:  1. Administer medication as ordered  2. Teach and rehearse alternative coping skills  3. Provide emotional support with 1:1 interaction with staff  1/25/2024 1402 by Bushra Thorne RN  Outcome: Progressing       Pt. Continues to be delusional, preoccupied/ pressured speech. Denies all. Tearful today about wanting to leave, feels he is being held here forever. Did attend groups today and socialized with nursing students. Hopeful for d/c tomorrow.

## 2024-01-25 NOTE — GROUP NOTE
Group Therapy Note    Date: 1/25/2024    Group Start Time: 1100  Group End Time: 1145  Group Topic: Psychoeducation    [facility]        Group Therapy Note  Clinician introduced the Wellness Recovery Action Plan to the group members. Members were able to complete the worksheet to have to refer to after discharge to help them maintain balance.  Attendees: 14       Patient's Goal:      Notes:  Patient was present for the group and discussion. He responded to 2 questions but was unable to write on the handout. Patient sat just outside the room in a chair but was listening.    Status After Intervention:  Improved    Participation Level: Active Listener and Interactive    Participation Quality: Appropriate, Attentive, and Sharing      Speech:  pressured      Thought Process/Content: Perseverating      Affective Functioning: Exaggerated      Mood: anxious      Level of consciousness:  Attentive      Response to Learning: Able to retain information      Endings: None Reported    Modes of Intervention: Education, Support, Exploration, and Activity      Discipline Responsible: /Counselor      Signature:  KAEL Majano

## 2024-01-25 NOTE — PROGRESS NOTES
Reassessment of PRN Zyprexa and Hydroxyzine.  Patient noted to be resting, eyes closed, respirations even and easy.  Medication effective.

## 2024-01-26 ENCOUNTER — TELEPHONE (OUTPATIENT)
Dept: FAMILY MEDICINE CLINIC | Age: 19
End: 2024-01-26

## 2024-01-26 VITALS
WEIGHT: 150 LBS | TEMPERATURE: 98.6 F | OXYGEN SATURATION: 98 % | SYSTOLIC BLOOD PRESSURE: 154 MMHG | HEIGHT: 69 IN | HEART RATE: 74 BPM | RESPIRATION RATE: 16 BRPM | BODY MASS INDEX: 22.22 KG/M2 | DIASTOLIC BLOOD PRESSURE: 84 MMHG

## 2024-01-26 PROCEDURE — 99239 HOSP IP/OBS DSCHRG MGMT >30: CPT

## 2024-01-26 PROCEDURE — 6370000000 HC RX 637 (ALT 250 FOR IP): Performed by: PSYCHIATRY & NEUROLOGY

## 2024-01-26 PROCEDURE — 6370000000 HC RX 637 (ALT 250 FOR IP)

## 2024-01-26 PROCEDURE — 5130000000 HC BRIDGE APPOINTMENT

## 2024-01-26 RX ORDER — DIVALPROEX SODIUM 125 MG/1
250 CAPSULE, COATED PELLETS ORAL EVERY 12 HOURS SCHEDULED
Qty: 120 CAPSULE | Refills: 0 | Status: SHIPPED | OUTPATIENT
Start: 2024-01-26

## 2024-01-26 RX ORDER — ARIPIPRAZOLE 10 MG/1
10 TABLET ORAL DAILY
Qty: 30 TABLET | Refills: 0 | Status: SHIPPED | OUTPATIENT
Start: 2024-01-26

## 2024-01-26 RX ADMIN — PANTOPRAZOLE SODIUM 40 MG: 40 TABLET, DELAYED RELEASE ORAL at 08:58

## 2024-01-26 RX ADMIN — ESCITALOPRAM OXALATE 10 MG: 10 TABLET ORAL at 08:58

## 2024-01-26 RX ADMIN — ARIPIPRAZOLE 10 MG: 10 TABLET ORAL at 08:58

## 2024-01-26 RX ADMIN — DIVALPROEX SODIUM 250 MG: 125 CAPSULE, COATED PELLETS ORAL at 08:58

## 2024-01-26 RX ADMIN — HYDROXYZINE HYDROCHLORIDE 50 MG: 50 TABLET, FILM COATED ORAL at 08:58

## 2024-01-26 NOTE — GROUP NOTE
Group Therapy Note    Date: 1/26/2024    Group Start Time: 1000  Group End Time: 1145  Group Topic: Psychoeducation    [facility] Janneth Grossman MSW        Group Therapy Note    Attendees: 9    Facilitator led a psychoeducation group that focused on understanding triggers. Group members were given a brief introduced to the neuropsychological process of triggers. Patients then explored how to identify triggers. They started exploring thoughts, feelings, behaviors, and physical symptoms experienced before and during and were empowered to utilize mindfulness of these symptoms to implement coping or grounding skills.         Notes:  Patient was engaged in group and participated appropriately.    Status After Intervention:  Improved    Participation Level: Active Listener and Interactive    Participation Quality: Appropriate, Attentive, Sharing, and Supportive      Speech:  normal      Thought Process/Content: Logical  Linear      Affective Functioning: Congruent      Mood: euthymic      Level of consciousness:  Alert, Oriented x4, and Attentive      Response to Learning: Able to verbalize current knowledge/experience, Able to verbalize/acknowledge new learning, Able to retain information, Capable of insight, Able to change behavior, and Progressing to goal      Endings: None Reported    Modes of Intervention: Education and Exploration      Discipline Responsible: /Counselor      Signature:  KAEL Drake

## 2024-01-26 NOTE — PLAN OF CARE
Patient denies SI/HI/AH/VH, at this time.  He reports he has \"a lot of anxiety\".  Requested PRN medication for anxiety.  He is observed pacing the hallways and socializing with select peers.  1:1 support provided by staff during episodes of anxiety.    Problem: Anxiety  Goal: Will report anxiety at manageable levels  Description: INTERVENTIONS:  1. Administer medication as ordered  2. Teach and rehearse alternative coping skills  3. Provide emotional support with 1:1 interaction with staff  1/26/2024 0032 by Elizabeth Fernandez RN  Outcome: Not Progressing  1/25/2024 1402 by Bushra Thorne RN  Outcome: Progressing     Problem: Anxiety  Goal: Will report anxiety at manageable levels  Description: INTERVENTIONS:  1. Administer medication as ordered  2. Teach and rehearse alternative coping skills  3. Provide emotional support with 1:1 interaction with staff  1/26/2024 0032 by Elizabeth Fernandez RN  Outcome: Not Progressing  1/25/2024 1402 by Bushra Thonre RN  Outcome: Progressing

## 2024-01-26 NOTE — GROUP NOTE
Group Therapy Note    Date: 1/25/2024    Group Start Time: 2020  Group End Time: 2100  Group Topic: Wrap-Up    [facility] [place] [facility]    Ilda Duncan RN        Group Therapy Note    Attendees: 13       Patient's Goal:  \"not to let my anxiety get the best of me.\"    Notes:  Pt continues to be delusional, but seems overall less anxious. He has tolerated being out with peers.    Status After Intervention:  Improved    Participation Level: Active Listener and Interactive    Participation Quality: Attentive and Sharing      Speech:  hesitant      Thought Process/Content: Linear  Delusional      Affective Functioning: Blunted      Mood: anxious      Level of consciousness:  Alert, Oriented x4, and Preoccupied      Response to Learning: Able to verbalize current knowledge/experience and Able to verbalize/acknowledge new learning      Endings: None Reported    Modes of Intervention: Support and Socialization      Discipline Responsible: Registered Nurse      Signature:  Ilda Duncan RN

## 2024-01-26 NOTE — TELEPHONE ENCOUNTER
LMTCB     Care Transitions Initial Follow Up Call    Outreach made within 2 business days of discharge: Yes    Patient: Dawson Emanuel Patient : 2005   MRN: [id number]  Reason for Admission: No discharge information exists for this patient.  Discharge Date:         Spoke with:     Discharge department/facility:   [facility] Interactive Patient Contact:  Was patient able to fill all prescriptions:   Was patient instructed to bring all medications to the follow-up visit:   Is patient taking all medications as directed in the discharge summary?   Does patient understand their discharge instructions:   Does patient have questions or concerns that need addressed prior to 7-14 day follow up office visit:     Scheduled appointment with PCP within 7-14 days    Follow Up  Future Appointments   Date Time Provider Department Center   2024  1:40 PM Margaret Liu DO [facility] [place] [facility] Karen R Waits, MA

## 2024-01-26 NOTE — PLAN OF CARE
Problem: Anxiety  Goal: Will report anxiety at manageable levels  Description: INTERVENTIONS:  1. Administer medication as ordered  2. Teach and rehearse alternative coping skills  3. Provide emotional support with 1:1 interaction with staff  1/26/2024 1023 by Coby Cummins RN  Outcome: Progressing  Flowsheets (Taken 1/26/2024 1019)  Will report anxiety at manageable levels:   Provide emotional support with 1:1 interaction with staff   Administer medication as ordered     Problem: Confusion  Goal: Confusion, delirium, dementia, or psychosis is improved or at baseline  Description: INTERVENTIONS:  1. Assess for possible contributors to thought disturbance, including medications, impaired vision or hearing, underlying metabolic abnormalities, dehydration, psychiatric diagnoses, and notify attending LIP  2. [place] high risk fall precautions, as indicated  3. Provide frequent short contacts to provide reality reorientation, refocusing and direction  4. Decrease environmental stimuli, including noise as appropriate  5. Monitor and intervene to maintain adequate nutrition, hydration, elimination, sleep and activity  6. If unable to ensure safety without constant attention obtain sitter and review sitter guidelines with assigned personnel  7. Initiate Psychosocial CNS and Spiritual Care consult, as indicated  Outcome: Progressing     Problem: Risk for Elopement  Goal: Patient will not exit the unit/facility without proper excort  Outcome: Progressing  Flowsheets (Taken 1/26/2024 1019)  Nursing Interventions for Elopement Risk: Assist with personal care needs such as toileting, eating, dressing, as needed to reduce the risk of wandering     Problem: Anxiety  Goal: Will report anxiety at manageable levels  Description: INTERVENTIONS:  1. Administer medication as ordered  2. Teach and rehearse alternative coping skills  3. Provide emotional support with 1:1 interaction with staff  1/26/2024 1023 by Maria G  XU Tenorio  Outcome: Progressing  Flowsheets (Taken 1/26/2024 1019)  Will report anxiety at manageable levels:   Provide emotional support with 1:1 interaction with staff   Administer medication as ordered  1/26/2024 0032 by Elizabeth Fernandez, RN  Outcome: Not Progressing   Patient pleasant and cooperative. Patient medication compliant. Patient denies SI/HI, AVH and pain. Patient voiced no concerns at time.

## 2024-01-26 NOTE — DISCHARGE SUMMARY
Discharge Summary    Admit Date: 1/21/2024   Discharge Date:    1/26/2024    Final Dx: Psychosis, unspecified psychosis type (HCC)     At Discharge: 51-60 moderate symptoms   All conditions and active problems were treated while patient was hospitalized.     Condition on DC  Mood and affect are stable and pt is not suicidal     VITALS:  BP (!) 154/88   Pulse 77   Temp 98.6 °F (37 °C) (Oral)   Resp 14   Ht 1.753 m (5' 9\")   Wt 68 kg (150 lb)   SpO2 99%   BMI 22.15 kg/m²     Brief Summary Present Illness    Patient is a 18 y.o. male who presents   to ED on a SOB after having increased anxiety and paranoid thoughts/delusions. Per SOB pt states \" I am a reincarnation of God\". Per SOB pt id having paranoid thoughts and delusions of being God. Pt states \" now that I told you I don't need anything else\".  SW notes:  \"Pt states he has been having an increase in anxiety and states he has not been able to sleep. He reports he is not currently suicidal and denies plan and intent. Pt reports he has had suicidal thoughts in the past but has not acted on them. Pt reports he has been having auditory hallucinations. Pt states he has been having visual hallucinations and states \" If I showed you, you would not be able to see them, they are terrifying\".  Pt reports he is God inside of [employer] body. Pt states he is a different version of Jacob Chandra.\"      Pt now on BHI, resting in bed.  Pt withdrawn, guarded.  Poor eye contact, did not open his eyes in our conversation.  He describes having increased anxiety, to the point that his \"words and emotions were no longer matching\", stating it is really hard to describe, but he felt that he had no control of his body any longer.  Pt was asked about the presence of demons or gods and he states that he had a \"feeling that something evil was close\", but then states he did not want to talk about that.  Pt reports poor sleep prior to his admission, able to sleep last night after  Methodology. Pain management screen  panel  Drug panel-PM-Hi Res Ur, Interp (PAIN) should be considered for drug  monitoring \".      PCP Screen, Urine 01/21/2024 Neg  Negative <25 ng/mL Final    Methadone Screen, Urine 01/21/2024 Neg  Negative <300 ng/mL Final    Oxycodone Urine 01/21/2024 Neg  Negative <100 ng/ml Final    FENTANYL SCREEN, URINE 01/21/2024 Neg  Negative <5 ng/mL Final    pH, UA 01/21/2024 6.0   Final    Comment: Urine pH less than 5.0 or greater than 8.0 may indicate sample adulteration.  Another sample should be collected if clinically  indicated.      Drug Screen Comment: 01/21/2024 see below   Final    Comment: This method is a screening test to detect only these drug  classes as part of a medical workup.  Confirmatory testing  by another method should be ordered if clinically indicated.      SARS-CoV-2 RNA, RT PCR 01/21/2024 NOT DETECTED  NOT DETECTED Final    Comment: Not Detected results do not preclude SARS-CoV-2 infection and  should not be used as the sole basis for patient management  decisions.  Results must be combined with clinical observations,  patient history, and epidemiological information.  Testing was performed using TRU JESSA SARS-CoV-2 and Influenza A/B  nucleic acid assay. This test is a multiplex Real-Time Reverse  Transcriptase Polymerase Chain Reaction (RT-PCR)-based in vitro  diagnostic test intended for the qualitative detection of nucleic  acids from SARS-CoV-2, influenza A, and influenza B in nasopharyngeal  and nasal swab specimens for use under the FDA’s Emergency Use  Authorization (EUA) only.    Patient Fact Sheet:  https://www.fda.gov/media/299669/download  Provider Fact Sheet: https://www.fda.gov/media/092287/download  EUA: https://www.fda.gov/media/586218/download  IFU: https://www.fda.gov/media/506679/download    Methodology:  RT-PCR      INFLUENZA A 01/21/2024 NOT DETECTED  NOT DETECTED Final    INFLUENZA B 01/21/2024 NOT DETECTED  NOT DETECTED Final

## 2024-01-26 NOTE — PROGRESS NOTES
01/26/24 1203   Encounter Summary   Encounter Overview/Reason  Behavioral Health;Initial Encounter   Service Provided For: Patient   Referral/Consult From: Patient   Support System Parent;Friends/neighbors   Last Encounter  01/26/24  (Initial visit, pt talked abt reasons for his tears before visit, offered words of encouragement)   Behavioral Health    Type  Initial Encounter   Assessment/Intervention/Outcome   Assessment Coping;Concerns with suffering;Tearful   Intervention Active listening;Discussed illness injury and it’s impact;Explored/Affirmed feelings, thoughts, concerns;Nurtured Hope;Discussed relationship with God   Outcome Encouraged;Engaged in conversation;Expressed Gratitude;Concerns relieved;Peace

## 2024-01-26 NOTE — GROUP NOTE
Group Therapy Note    Date: 2024    Group Start Time:   Group End Time:   Group Topic: Wrap-Up    [facility]    Ilda Duncan RN        Group Therapy Note    Attendees: 12         Patient's Goal:  ***    Notes:  ***    Status After Intervention:  {Status After Intervention:441918123}    Participation Level: {Participation Level:627288183}    Participation Quality: {[facility] PARTICIPATION QUALITY:508025800}      Speech:  {[facility] CD_SPEECH:42201}      Thought Process/Content: {Thought Process/Content:553633233}      Affective Functioning: {Affective Functionin}      Mood: {Mood:166680934}      Level of consciousness:  {Level of consciousness:339774474}      Response to Learning: {[facility] Responses to Learnin}      Endings: {[facility] Endings:08013}    Modes of Intervention: {[facility] BHI Modes of Intervention:072624361}      Discipline Responsible: {[facility] Multidisciplinary:006932846}      Signature:  Ilda Duncan RN

## 2024-01-26 NOTE — PROGRESS NOTES
Bridge Appointment completed: Reviewed Discharge Instructions with patient.    Patient verbalizes understanding and agreement with the discharge plan using the teachback method.     Referral for Outpatient Tobacco Cessation Counseling, upon discharge (reshma X if applicable and completed):    ( )  Hospital staff assisted patient to call Quit Line or faxed referral                                   during hospitalization                  ( )  Recognizing danger situations (included triggers and roadblocks), if not completed on admission                    ( )  Coping skills (new ways to manage stress, exercise, relaxation techniques, changing routine, distraction), if not completed on admission                                                           ( )  Basic information about quitting (benefits of quitting, techniques in how to quit, available resources, if not completed on admission  ( ) [facility]   (x ) Patient refused referral  ( x) Patient refused counseling  ( ) Patient refused smoking cessation medication upon discharge    Vaccinations (reshma X if applicable and completed):  ( ) Patient states already received influenza vaccine elsewhere  ( ) Patient received influenza vaccine during this hospitalization  ( ) Patient refused influenza vaccine at this time  (x ) Not offered

## 2024-01-26 NOTE — PROGRESS NOTES
[facility]al [facility]  Discharge Note    Pt discharged with followings belongings:   Dental Appliances: None  Vision - Corrective Lenses: None  Hearing Aid: None  Jewelry: Necklace (alledgely silver)  Body Piercings Removed: No  Clothing: Footwear, Jacket/Coat, Shirt, Pants  Other Valuables: Other (Comment) (none)   Valuables returned to patient. Patient educated on aftercare instructions: yes  Information faxed to [facility] by Coby Cummins RN  at 205PM.Patient verbalize understanding of AVS:  yes.    Status EXAM upon discharge:  Mental Status and Behavioral Exam  Normal: No  Level of Assistance: Independent/Self  Facial Expression: Worried, Elevated  Affect: Unstable  Level of Consciousness: Alert  Frequency of Checks: 4 times per hour, close  Mood:Normal: No  Mood: Anxious, Suspicious  Motor Activity:Normal: Yes  Motor Activity: Increased  Eye Contact: Good  Observed Behavior: Preoccupied  Sexual Misconduct History: Current - no  Preception: [place] to person, [place] to time, [place] to place  Attention:Normal: No  Attention: Distractible, Unable to concentrate  Thought Processes: Loose association  Thought Content:Normal: No  Thought Content: Preoccupations  Depression Symptoms: Impaired concentration, Change in energy level  Anxiety Symptoms: Generalized  Ave Symptoms: Poor judgment, Flight of ideas  Hallucinations: None  Delusions: Yes  Delusions: Paranoid, Persecutory  Memory:Normal: No  Memory: Poor recent  Insight and Judgment: No  Insight and Judgment: Poor judgment, Poor insight    Tobacco Screening:  Practical Counseling, on admission, reshma X, if applicable and completed (first 3 are required if patient doesn't refuse):            ( ) Recognizing danger situations (included triggers and roadblocks)                    ( ) Coping skills (new ways to manage stress,relaxation techniques, changing routine, distraction)                                                           ( ) Basic  information about quitting (benefits of quitting, techniques in how to quit, available resources  ( ) [facility]                                                                                                                   ( ) Patient refused counseling  (x ) Patient refused referral  ( ) Patient refused prescription upon discharge  ( ) Patient has not smoked in the last 30 days    Metabolic Screening:    No results found for: \"LABA1C\"    No results found for: \"CHOL\"  No results found for: \"TRIG\"  No results found for: \"HDL\"  No components found for: \"LDLCAL\"  No components found for: \"LABVLDL\"    Coby Cummins RN

## 2024-01-26 NOTE — PROGRESS NOTES
Reassessment of PRN hydroxyzine, Zyprexa and Trazodone. Patient observed pacing hallway.  Reports his anxiety has improved a \"little\".

## 2024-01-26 NOTE — TELEPHONE ENCOUNTER
Sterling Shahid schedule patient's hospital follow up. (He discharged home today)    Please reach out to pt to do TCM questions

## 2024-01-26 NOTE — PROGRESS NOTES
Patient requested PRN medication for anxiety, agitation and insomnia.  Hydroxyzine, zyprexa and trazodone given @ 1942.

## 2024-01-26 NOTE — TRANSITION OF CARE
Behavioral Health Transition Record to Provider    Patient Name: Dawson Emanuel  YOB: 2005   Medical Record Number: [id number]  Date of Admission: 1/21/2024  3:28 PM   Date of Discharge: 1/26/2024    Attending Provider: Laz Dewitt MD   Discharging Provider: Rajni Teresa NP  To contact this individual call [phone] and ask the  to page.  If unavailable, ask to be transferred to Behavioral Health Provider on call.  A Behavioral Health Provider will be available on call 24/7 and during holidays.    Primary Care Provider: Margaret Liu DO    Allergies   Allergen Reactions    Penicillin G      Other Reaction(s): Unknown       Reason for Admission: Psychosis, Delusions, Paranoia     Admission Diagnosis: Anxiety [F41.9]  Psychosis, unspecified psychosis type (HCC) [F29]    * No surgery found *    Results for orders placed or performed during the hospital encounter of 01/21/24   COVID-19 & Influenza Combo    Specimen: Nasopharyngeal Swab   Result Value Ref Range    SARS-CoV-2 RNA, RT PCR NOT DETECTED NOT DETECTED    INFLUENZA A NOT DETECTED NOT DETECTED    INFLUENZA B NOT DETECTED NOT DETECTED   CBC with Auto Differential   Result Value Ref Range    WBC 7.0 4.0 - 11.0 K/uL    RBC 5.29 4.20 - 5.90 M/uL    Hemoglobin 15.3 13.5 - 17.5 g/dL    Hematocrit 44.9 40.5 - 52.5 %    MCV 85.0 80.0 - 100.0 fL    MCH 28.9 26.0 - 34.0 pg    MCHC 34.0 31.0 - 36.0 g/dL    RDW 13.5 12.4 - 15.4 %    Platelets 233 135 - 450 K/uL    MPV 8.7 5.0 - 10.5 fL    Neutrophils % 73.0 %    Lymphocytes % 18.5 %    Monocytes % 7.7 %    Eosinophils % 0.1 %    Basophils % 0.7 %    Neutrophils Absolute 5.1 1.7 - 7.7 K/uL    Lymphocytes Absolute 1.3 1.0 - 5.1 K/uL    Monocytes Absolute 0.5 0.0 - 1.3 K/uL    Eosinophils Absolute 0.0 0.0 - 0.6 K/uL    Basophils Absolute 0.0 0.0 - 0.2 K/uL   CMP w/ Reflex to MG   Result Value Ref Range    Sodium 136 136 - 145 mmol/L    Potassium reflex Magnesium 3.6 3.5 - 5.1 mmol/L     cessation? Yes  Patient referred to the following for tobacco cessation with an appointment? Patient refused  Patient was offered medication to assist with tobacco cessation at discharge? Patient refused    Alcohol/Substance Abuse Discharge Plan:   Does the patient have a history of substance/alcohol abuse and requires a referral for treatment? No  Patient referred to the following for substance/alcohol abuse treatment with an appointment? No  Patient was offered medication to assist with alcohol cessation at discharge? No      Patient discharged to Home; Other N/A

## 2024-01-29 ENCOUNTER — OFFICE VISIT (OUTPATIENT)
Dept: FAMILY MEDICINE CLINIC | Age: 19
End: 2024-01-29
Payer: COMMERCIAL

## 2024-01-29 VITALS
WEIGHT: 144.6 LBS | BODY MASS INDEX: 21.35 KG/M2 | SYSTOLIC BLOOD PRESSURE: 161 MMHG | HEART RATE: 84 BPM | DIASTOLIC BLOOD PRESSURE: 97 MMHG | OXYGEN SATURATION: 96 %

## 2024-01-29 DIAGNOSIS — Z09 HOSPITAL DISCHARGE FOLLOW-UP: Primary | ICD-10-CM

## 2024-01-29 PROCEDURE — 1111F DSCHRG MED/CURRENT MED MERGE: CPT | Performed by: SURGERY

## 2024-01-29 PROCEDURE — 99214 OFFICE O/P EST MOD 30 MIN: CPT | Performed by: SURGERY

## 2024-01-29 NOTE — PROGRESS NOTES
Post-Discharge Transitional Care  Follow Up      Dawson Emanuel   YOB: 2005    Date of Office Visit:  1/29/2024  Date of Hospital Admission: 1/21/24  Date of Hospital Discharge: 1/26/24  Risk of hospital readmission (high >=14%. Medium >=10%) :Readmission Risk Score: 5.7      Care management risk score Rising risk (score 2-5) and Complex Care (Scores >=6): No Risk Score On File     Non face to face  following discharge, date last encounter closed (first attempt may have been earlier): 01/26/2024    Call initiated 2 business days of discharge: Yes    ASSESSMENT/PLAN:   Hospital discharge follow-up  -     TX DISCHARGE MEDS RECONCILED W/ CURRENT OUTPATIENT MED LIST    Medical Decision Making: moderate complexity  Return in 3 months (on 4/29/2024).           Subjective:   HPI:  Follow up of Hospital problems/diagnosis(es): Acute delusions and hallucinations    Inpatient course: Discharge summary reviewed- see chart.    Interval history/Current status: Has been doing really well since discharge notes he feels \"really good\".  He has not been the antipsychotics.  He believes his episode was triggered by delta 8 and will avoid this at all costs.  He does continue to take his Lexapro and feels his anxiety and depression are under good control at this time.  We discussed that I cannot make him take his antipsychotics and that if he feels himself slipping or any change in mental status to please call us immediately.  He agrees to this plan.    Patient Active Problem List   Diagnosis    Anxiety and depression    Anxiety    Irritable bowel syndrome with both constipation and diarrhea    Psychosis, unspecified psychosis type (HCC)    Cannabis use, unspecified, uncomplicated       Medications listed as ordered at the time of discharge from hospital     Medication List            Accurate as of January 29, 2024  2:18 PM. If you have any questions, ask your nurse or doctor.                CONTINUE taking these

## 2024-01-30 ENCOUNTER — FOLLOWUP TELEPHONE ENCOUNTER (OUTPATIENT)
Dept: PSYCHIATRY | Age: 19
End: 2024-01-30

## 2024-01-31 ENCOUNTER — FOLLOWUP TELEPHONE ENCOUNTER (OUTPATIENT)
Dept: PSYCHIATRY | Age: 19
End: 2024-01-31

## 2024-02-08 ENCOUNTER — FOLLOWUP TELEPHONE ENCOUNTER (OUTPATIENT)
Dept: PSYCHIATRY | Age: 19
End: 2024-02-08

## 2024-02-09 ENCOUNTER — TELEPHONE (OUTPATIENT)
Dept: FAMILY MEDICINE CLINIC | Age: 19
End: 2024-02-09

## 2024-02-09 ENCOUNTER — HOSPITAL ENCOUNTER (OUTPATIENT)
Dept: PSYCHIATRY | Age: 19
Setting detail: THERAPIES SERIES
Discharge: HOME | End: 2024-02-09
Payer: COMMERCIAL

## 2024-02-09 VITALS
TEMPERATURE: 97.5 F | HEART RATE: 93 BPM | RESPIRATION RATE: 16 BRPM | DIASTOLIC BLOOD PRESSURE: 88 MMHG | OXYGEN SATURATION: 99 % | SYSTOLIC BLOOD PRESSURE: 143 MMHG

## 2024-02-09 DIAGNOSIS — F43.10 PTSD (POST-TRAUMATIC STRESS DISORDER): ICD-10-CM

## 2024-02-09 DIAGNOSIS — F41.1 GENERALIZED ANXIETY DISORDER: Primary | ICD-10-CM

## 2024-02-09 PROBLEM — F19.959 SUBSTANCE OR MEDICATION-INDUCED PSYCHOTIC DISORDER (HCC): Status: ACTIVE | Noted: 2024-01-21

## 2024-02-09 PROBLEM — F19.959 SUBSTANCE OR MEDICATION-INDUCED PSYCHOTIC DISORDER (HCC): Status: RESOLVED | Noted: 2024-01-21 | Resolved: 2024-02-09

## 2024-02-09 PROCEDURE — 99215 OFFICE O/P EST HI 40 MIN: CPT

## 2024-02-09 NOTE — TELEPHONE ENCOUNTER
Patient is asking for guidance in getting a medical marijuana for sleep and anxiety.  He is asking to have a conversation with Dr. Liu    1/29/2024 last appointment

## 2024-02-09 NOTE — PROGRESS NOTES
Chain Reaction (RT-PCR)-based in vitro  diagnostic test intended for the qualitative detection of nucleic  acids from SARS-CoV-2, influenza A, and influenza B in nasopharyngeal  and nasal swab specimens for use under the FDA’s Emergency Use  Authorization (EUA) only.    Patient Fact Sheet:  https://www.fda.gov/media/869354/download  Provider Fact Sheet: https://www.fda.gov/media/561441/download  EUA: https://www.fda.gov/media/293816/download  IFU: https://www.fda.gov/media/448325/download    Methodology:  RT-PCR      INFLUENZA A 01/21/2024 NOT DETECTED  NOT DETECTED Final    INFLUENZA B 01/21/2024 NOT DETECTED  NOT DETECTED Final            Medications  No current facility-administered medications for this encounter.    ASSESSMENT AND PLAN    Principal Problem (Resolved):    Substance or medication-induced psychotic disorder (HCC)  Active Problems:    Generalized anxiety disorder    PTSD (post-traumatic stress disorder)       1.Total time with patient was 40 minutes and more than 50 % of that time was spent counseling the patient on their symptoms, treatment and expected goals.     Rajni Teresa, PMHNP-BC

## 2024-02-15 ENCOUNTER — OFFICE VISIT (OUTPATIENT)
Dept: FAMILY MEDICINE CLINIC | Age: 19
End: 2024-02-15
Payer: COMMERCIAL

## 2024-02-15 VITALS
WEIGHT: 145.6 LBS | OXYGEN SATURATION: 98 % | HEART RATE: 72 BPM | BODY MASS INDEX: 21.56 KG/M2 | DIASTOLIC BLOOD PRESSURE: 83 MMHG | HEIGHT: 69 IN | SYSTOLIC BLOOD PRESSURE: 126 MMHG

## 2024-02-15 DIAGNOSIS — F41.9 ANXIETY: Primary | ICD-10-CM

## 2024-02-15 PROCEDURE — G8420 CALC BMI NORM PARAMETERS: HCPCS | Performed by: SURGERY

## 2024-02-15 PROCEDURE — G8484 FLU IMMUNIZE NO ADMIN: HCPCS | Performed by: SURGERY

## 2024-02-15 PROCEDURE — 1111F DSCHRG MED/CURRENT MED MERGE: CPT | Performed by: SURGERY

## 2024-02-15 PROCEDURE — 1036F TOBACCO NON-USER: CPT | Performed by: SURGERY

## 2024-02-15 PROCEDURE — 99214 OFFICE O/P EST MOD 30 MIN: CPT | Performed by: SURGERY

## 2024-02-15 PROCEDURE — G8427 DOCREV CUR MEDS BY ELIG CLIN: HCPCS | Performed by: SURGERY

## 2024-02-15 RX ORDER — BUSPIRONE HYDROCHLORIDE 5 MG/1
5 TABLET ORAL 3 TIMES DAILY PRN
Qty: 90 TABLET | Refills: 0 | Status: SHIPPED | OUTPATIENT
Start: 2024-02-15 | End: 2024-03-16
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
## 2024-02-26 ENCOUNTER — TELEMEDICINE (OUTPATIENT)
Dept: PSYCHOLOGY | Age: 19
End: 2024-02-26
Payer: COMMERCIAL

## 2024-02-26 DIAGNOSIS — F43.10 PTSD (POST-TRAUMATIC STRESS DISORDER): Primary | ICD-10-CM

## 2024-02-26 PROCEDURE — 1036F TOBACCO NON-USER: CPT | Performed by: SOCIAL WORKER

## 2024-02-26 PROCEDURE — 90834 PSYTX W PT 45 MINUTES: CPT | Performed by: SOCIAL WORKER

## 2024-02-26 NOTE — PATIENT INSTRUCTIONS
Take 2-3 diaphragmatic breaths several times/day, then as needed for physical sxs of anxiety/anger.  When feeling triggered, take a break, breath and distract.

## 2024-03-05 NOTE — PROGRESS NOTES
Behavioral Health Consultation- Follow UP  JOSH Moffett  3/11/2024   3:46 PM      Dawson Emanuel, was evaluated through a synchronous (real-time) audio-video encounter. The patient (or guardian if applicable) is aware that this is a billable service, which includes applicable co-pays. This Virtual Visit was conducted with patient's (and/or legal guardian's) consent. Verbal consent to proceed has been obtained within the past 12 months. Patient identification was verified, and a caregiver was present when appropriate. The patient was located in a state where the provider was licensed to provide care.    Time spent with Patient: 33 minutes  This is patient's third  [facility] appointment.    Reason for Consult:    Chief Complaint   Patient presents with    Anxiety    Depression     Referring Provider: Margaret Bro DO  [address]  [place] [address]    Patient provided informed consent for the behavioral health program. Discussed with patient model of service to include the limits of confidentiality (i.e. abuse reporting, suicide intervention, etc.) and short-term intervention focused approach. Pt indicated understanding. Feedback given to PCP.    Verified the following information:  Patient's identification: Yes  Patient location: [address] [place] [address]   Patient's call back number: [phone]   Patient's emergency contact's name and number, as well as permission to contact them if needed: Extended Emergency Contact Information  Primary Emergency Contact: Rebecca Emanuel  Home Phone: [phone]  Mobile Phone: [phone]  Relation: Parent     Provider location: [place], [place]     S:  Last appointment 2/26/24    Pt reports that he has been feeling lonely recently. He has been struggling to find a job, and has been spending time alone. Pt has been playing video games with others online. Pt states that he has not been spending time with his friends, and he doesn't have interest

## 2024-03-11 ENCOUNTER — TELEMEDICINE (OUTPATIENT)
Dept: PSYCHOLOGY | Age: 19
End: 2024-03-11
Payer: COMMERCIAL

## 2024-03-11 DIAGNOSIS — F32.A DEPRESSIVE DISORDER: ICD-10-CM

## 2024-03-11 DIAGNOSIS — F43.10 PTSD (POST-TRAUMATIC STRESS DISORDER): Primary | ICD-10-CM

## 2024-03-11 PROCEDURE — 90832 PSYTX W PT 30 MINUTES: CPT | Performed by: SOCIAL WORKER

## 2024-03-11 PROCEDURE — 1036F TOBACCO NON-USER: CPT | Performed by: SOCIAL WORKER

## 2024-03-11 NOTE — PATIENT INSTRUCTIONS
Spend time outside 1-2x/week.  Referrals: [facility]: [email or web address]  Sabrina aTpia: [email or web address]   Sydney Giles: [email or web address]   Maris Lopez: [email or web address]

## 2024-03-25 ENCOUNTER — TELEPHONE (OUTPATIENT)
Dept: PSYCHOLOGY | Age: 19
End: 2024-03-25

## 2024-03-26 ENCOUNTER — OFFICE VISIT (OUTPATIENT)
Dept: FAMILY MEDICINE CLINIC | Age: 19
End: 2024-03-26
Payer: COMMERCIAL

## 2024-03-26 VITALS
OXYGEN SATURATION: 99 % | HEART RATE: 60 BPM | DIASTOLIC BLOOD PRESSURE: 66 MMHG | SYSTOLIC BLOOD PRESSURE: 119 MMHG | HEIGHT: 69 IN | BODY MASS INDEX: 23.7 KG/M2 | WEIGHT: 160 LBS

## 2024-03-26 DIAGNOSIS — F41.9 ANXIETY AND DEPRESSION: Primary | ICD-10-CM

## 2024-03-26 DIAGNOSIS — F32.A ANXIETY AND DEPRESSION: Primary | ICD-10-CM

## 2024-03-26 PROCEDURE — G8420 CALC BMI NORM PARAMETERS: HCPCS | Performed by: SURGERY

## 2024-03-26 PROCEDURE — 99214 OFFICE O/P EST MOD 30 MIN: CPT | Performed by: SURGERY

## 2024-03-26 PROCEDURE — G8484 FLU IMMUNIZE NO ADMIN: HCPCS | Performed by: SURGERY

## 2024-03-26 PROCEDURE — G8427 DOCREV CUR MEDS BY ELIG CLIN: HCPCS | Performed by: SURGERY

## 2024-03-26 PROCEDURE — 1036F TOBACCO NON-USER: CPT | Performed by: SURGERY

## 2024-03-26 ASSESSMENT — ANXIETY QUESTIONNAIRES
2. NOT BEING ABLE TO STOP OR CONTROL WORRYING: NEARLY EVERY DAY
6. BECOMING EASILY ANNOYED OR IRRITABLE: NEARLY EVERY DAY
3. WORRYING TOO MUCH ABOUT DIFFERENT THINGS: SEVERAL DAYS
7. FEELING AFRAID AS IF SOMETHING AWFUL MIGHT HAPPEN: NEARLY EVERY DAY
GAD7 TOTAL SCORE: 15
1. FEELING NERVOUS, ANXIOUS, OR ON EDGE: MORE THAN HALF THE DAYS
5. BEING SO RESTLESS THAT IT IS HARD TO SIT STILL: NOT AT ALL
4. TROUBLE RELAXING: NEARLY EVERY DAY

## 2024-03-26 ASSESSMENT — PATIENT HEALTH QUESTIONNAIRE - PHQ9
2. FEELING DOWN, DEPRESSED OR HOPELESS: NEARLY EVERY DAY
SUM OF ALL RESPONSES TO PHQ9 QUESTIONS 1 & 2: 4
5. POOR APPETITE OR OVEREATING: NOT AT ALL
9. THOUGHTS THAT YOU WOULD BE BETTER OFF DEAD, OR OF HURTING YOURSELF: SEVERAL DAYS
4. FEELING TIRED OR HAVING LITTLE ENERGY: NEARLY EVERY DAY
3. TROUBLE FALLING OR STAYING ASLEEP: NEARLY EVERY DAY
SUM OF ALL RESPONSES TO PHQ QUESTIONS 1-9: 19
8. MOVING OR SPEAKING SO SLOWLY THAT OTHER PEOPLE COULD HAVE NOTICED. OR THE OPPOSITE, BEING SO FIGETY OR RESTLESS THAT YOU HAVE BEEN MOVING AROUND A LOT MORE THAN USUAL: MORE THAN HALF THE DAYS
7. TROUBLE CONCENTRATING ON THINGS, SUCH AS READING THE NEWSPAPER OR WATCHING TELEVISION: NEARLY EVERY DAY
10. IF YOU CHECKED OFF ANY PROBLEMS, HOW DIFFICULT HAVE THESE PROBLEMS MADE IT FOR YOU TO DO YOUR WORK, TAKE CARE OF THINGS AT HOME, OR GET ALONG WITH OTHER PEOPLE: EXTREMELY DIFFICULT
6. FEELING BAD ABOUT YOURSELF - OR THAT YOU ARE A FAILURE OR HAVE LET YOURSELF OR YOUR FAMILY DOWN: NEARLY EVERY DAY
SUM OF ALL RESPONSES TO PHQ QUESTIONS 1-9: 18
SUM OF ALL RESPONSES TO PHQ QUESTIONS 1-9: 19
1. LITTLE INTEREST OR PLEASURE IN DOING THINGS: SEVERAL DAYS
SUM OF ALL RESPONSES TO PHQ QUESTIONS 1-9: 19

## 2024-03-26 ASSESSMENT — COLUMBIA-SUICIDE SEVERITY RATING SCALE - C-SSRS
6. HAVE YOU EVER DONE ANYTHING, STARTED TO DO ANYTHING, OR PREPARED TO DO ANYTHING TO END YOUR LIFE?: NO
2. HAVE YOU ACTUALLY HAD ANY THOUGHTS OF KILLING YOURSELF?: NO
1. WITHIN THE PAST MONTH, HAVE YOU WISHED YOU WERE DEAD OR WISHED YOU COULD GO TO SLEEP AND NOT WAKE UP?: YES

## 2024-03-26 NOTE — PROGRESS NOTES
3/26/2024    This is a 18 y.o. male   Chief Complaint   Patient presents with    Medication Problem    Anxiety     Feels the meds are not woking and having mild  panic attack x 2 wks   .    Notes he got a new job but was there for a couple of hours and then became overwhelmed and had to go home.  He is feeling quite low that he cannot get a job or hold a job due to poor control of his mental state.    Has been taking the buspirone on an as-needed basis, not really helping a whole lot.  He feels numb and anhedonic today.  PHQ 9 was 19 and FARZANEH-7 was 15.  We will try an antidepressant that also helps with anxiety, did not benefit from Lexapro.  We will start sertraline 50 mg daily, he may take half tablet the first few days to avoid GI upset then increase to whole tablet daily at least 2 weeks.          Patient Active Problem List   Diagnosis    Anxiety and depression    Anxiety    Irritable bowel syndrome with both constipation and diarrhea    Cannabis use, unspecified, uncomplicated    Generalized anxiety disorder    PTSD (post-traumatic stress disorder)       Current Outpatient Medications   Medication Sig Dispense Refill    sertraline (ZOLOFT) 50 MG tablet Take 1 tablet by mouth daily 90 tablet 1    divalproex (DEPAKOTE SPRINKLE) 125 MG DR capsule Take 2 capsules by mouth every 12 hours 120 capsule 0    escitalopram (LEXAPRO) 10 MG tablet Take 1 tablet by mouth daily 30 tablet 2    pantoprazole (PROTONIX) 40 MG tablet Take 1 tablet by mouth daily 30 tablet 2    dicyclomine (BENTYL) 20 MG tablet Take 1 tablet by mouth      ARIPiprazole (ABILIFY) 10 MG tablet Take 1 tablet by mouth daily (Patient not taking: Reported on 3/26/2024) 30 tablet 0     No current facility-administered medications for this visit.       Allergies   Allergen Reactions    Penicillin G      Other Reaction(s): Unknown       Social History     Tobacco Use    Smoking status: Never     Passive exposure: Past    Smokeless tobacco: Never    Tobacco

## 2024-04-12 ENCOUNTER — TELEPHONE (OUTPATIENT)
Dept: PSYCHOLOGY | Age: 19
End: 2024-04-12

## 2024-09-27 DIAGNOSIS — F41.9 ANXIETY AND DEPRESSION: ICD-10-CM

## 2024-09-27 DIAGNOSIS — F32.A ANXIETY AND DEPRESSION: ICD-10-CM

## 2025-03-27 DIAGNOSIS — F32.A ANXIETY AND DEPRESSION: ICD-10-CM

## 2025-03-27 DIAGNOSIS — F41.9 ANXIETY AND DEPRESSION: ICD-10-CM

## 2025-03-27 NOTE — TELEPHONE ENCOUNTER
No future appointments.  LOV 3/26/2024  Sent scheduling ticket via [email or web address] to schedule physical/med check.

## 2025-06-27 ENCOUNTER — TELEPHONE (OUTPATIENT)
Dept: FAMILY MEDICINE CLINIC | Age: 20
End: 2025-06-27

## 2025-06-27 DIAGNOSIS — F32.A ANXIETY AND DEPRESSION: ICD-10-CM

## 2025-06-27 DIAGNOSIS — F41.9 ANXIETY AND DEPRESSION: ICD-10-CM

## 2025-06-27 NOTE — TELEPHONE ENCOUNTER
Patient came in office for his appt that was canceled due to provider not being here. He is asking if dr santiago can just up his dose of sertraline and give him something to help him sleep until he can get in with her please advise    Future Appointments   Date Time Provider Department Center   6/27/2025  4:20 PM Margaret Santiago DO [facility]   7/15/2025  4:00 PM Margaret Santiago DO [facility]

## 2025-07-12 RX ORDER — SERTRALINE HYDROCHLORIDE 100 MG/1
100 TABLET, FILM COATED ORAL DAILY
Qty: 90 TABLET | Refills: 0 | Status: SHIPPED | OUTPATIENT
Start: 2025-07-12

## 2025-07-21 ENCOUNTER — OFFICE VISIT (OUTPATIENT)
Dept: FAMILY MEDICINE CLINIC | Age: 20
End: 2025-07-21
Payer: COMMERCIAL

## 2025-07-21 VITALS
HEIGHT: 69 IN | TEMPERATURE: 97 F | SYSTOLIC BLOOD PRESSURE: 150 MMHG | WEIGHT: 235.8 LBS | BODY MASS INDEX: 34.93 KG/M2 | HEART RATE: 78 BPM | RESPIRATION RATE: 16 BRPM | OXYGEN SATURATION: 97 % | DIASTOLIC BLOOD PRESSURE: 96 MMHG

## 2025-07-21 DIAGNOSIS — F51.05 INSOMNIA DUE TO OTHER MENTAL DISORDER: ICD-10-CM

## 2025-07-21 DIAGNOSIS — F99 INSOMNIA DUE TO OTHER MENTAL DISORDER: ICD-10-CM

## 2025-07-21 DIAGNOSIS — F32.A ANXIETY AND DEPRESSION: Primary | ICD-10-CM

## 2025-07-21 DIAGNOSIS — F41.9 ANXIETY AND DEPRESSION: Primary | ICD-10-CM

## 2025-07-21 PROCEDURE — G2211 COMPLEX E/M VISIT ADD ON: HCPCS | Performed by: SURGERY

## 2025-07-21 PROCEDURE — G8417 CALC BMI ABV UP PARAM F/U: HCPCS | Performed by: SURGERY

## 2025-07-21 PROCEDURE — G8427 DOCREV CUR MEDS BY ELIG CLIN: HCPCS | Performed by: SURGERY

## 2025-07-21 PROCEDURE — 99214 OFFICE O/P EST MOD 30 MIN: CPT | Performed by: SURGERY

## 2025-07-21 PROCEDURE — 1036F TOBACCO NON-USER: CPT | Performed by: SURGERY

## 2025-07-21 RX ORDER — TRAZODONE HYDROCHLORIDE 50 MG/1
50 TABLET ORAL NIGHTLY PRN
Qty: 30 TABLET | Refills: 0 | Status: SHIPPED | OUTPATIENT
Start: 2025-07-21

## 2025-07-21 RX ORDER — IBUPROFEN 800 MG/1
TABLET, FILM COATED ORAL
COMMUNITY
Start: 2025-06-23 | End: 2025-07-21

## 2025-07-21 SDOH — ECONOMIC STABILITY: FOOD INSECURITY: WITHIN THE PAST 12 MONTHS, YOU WORRIED THAT YOUR FOOD WOULD RUN OUT BEFORE YOU GOT MONEY TO BUY MORE.: NEVER TRUE

## 2025-07-21 SDOH — ECONOMIC STABILITY: FOOD INSECURITY: WITHIN THE PAST 12 MONTHS, THE FOOD YOU BOUGHT JUST DIDN'T LAST AND YOU DIDN'T HAVE MONEY TO GET MORE.: NEVER TRUE

## 2025-07-21 ASSESSMENT — PATIENT HEALTH QUESTIONNAIRE - PHQ9
2. FEELING DOWN, DEPRESSED OR HOPELESS: NOT AT ALL
6. FEELING BAD ABOUT YOURSELF - OR THAT YOU ARE A FAILURE OR HAVE LET YOURSELF OR YOUR FAMILY DOWN: NOT AT ALL
9. THOUGHTS THAT YOU WOULD BE BETTER OFF DEAD, OR OF HURTING YOURSELF: NOT AT ALL
7. TROUBLE CONCENTRATING ON THINGS, SUCH AS READING THE NEWSPAPER OR WATCHING TELEVISION: NOT AT ALL
4. FEELING TIRED OR HAVING LITTLE ENERGY: NOT AT ALL
SUM OF ALL RESPONSES TO PHQ QUESTIONS 1-9: 0
3. TROUBLE FALLING OR STAYING ASLEEP: NOT AT ALL
SUM OF ALL RESPONSES TO PHQ QUESTIONS 1-9: 0
8. MOVING OR SPEAKING SO SLOWLY THAT OTHER PEOPLE COULD HAVE NOTICED. OR THE OPPOSITE, BEING SO FIGETY OR RESTLESS THAT YOU HAVE BEEN MOVING AROUND A LOT MORE THAN USUAL: NOT AT ALL
5. POOR APPETITE OR OVEREATING: NOT AT ALL
1. LITTLE INTEREST OR PLEASURE IN DOING THINGS: NOT AT ALL
10. IF YOU CHECKED OFF ANY PROBLEMS, HOW DIFFICULT HAVE THESE PROBLEMS MADE IT FOR YOU TO DO YOUR WORK, TAKE CARE OF THINGS AT HOME, OR GET ALONG WITH OTHER PEOPLE: NOT DIFFICULT AT ALL

## 2025-07-21 NOTE — PROGRESS NOTES
7/21/2025    History of Present Illness  The patient presents for evaluation of sleep issues.    He reports overall good health but struggles with sleep disturbances. His sleep pattern is inconsistent, with periods of excessive sleep followed by insomnia. He has not sought medication for his sleep issues and has been experiencing these problems since his hospital discharge. The longest period of stable sleep he recalls is 1 to 2 weeks. He has tried Benadryl and melatonin for sleep, but these have not been effective. Melatonin worked for a week, but then he had to increase the dose from 1 to 2 tablets to 4 extra strength tablets, which did not help. He was prescribed trazodone during his hospital stay but does not remember if it was beneficial. He believes that improving his sleep could significantly enhance his overall well-being.    He has been on sertraline 50 mg, which he tolerates well. However, an increase to 100 mg resulted in side effects such as tingling sensations and dizziness upon head movement. He has not taken the 100 mg dose for the past 2 days due to these side effects. He has irritable bowel syndrome and reports that gastrointestinal upset is a common issue for him.    He has noticed a weight gain of 6 pounds since his last weigh-in, which he attributes to his sleep issues. He has always experienced weight fluctuations, with his weight peaking at 215 pounds during his senior year of high school. He reports no feelings of anxiety.    Social History:  Hobbies: Playing games on his phone, previously played video games extensively  Sleep: Reports inconsistent sleep patterns, either sleeping all day or not at all  Living Condition: Lives with his parents    This is a 20 y.o. male No chief complaint on file.  .    HPI     Patient Active Problem List   Diagnosis    Anxiety and depression    Anxiety    Irritable bowel syndrome with both constipation and diarrhea    Cannabis use, unspecified, uncomplicated

## 2025-08-22 DIAGNOSIS — F41.9 ANXIETY AND DEPRESSION: ICD-10-CM

## 2025-08-22 DIAGNOSIS — F51.05 INSOMNIA DUE TO OTHER MENTAL DISORDER: ICD-10-CM

## 2025-08-22 DIAGNOSIS — F32.A ANXIETY AND DEPRESSION: ICD-10-CM

## 2025-08-22 DIAGNOSIS — F99 INSOMNIA DUE TO OTHER MENTAL DISORDER: ICD-10-CM

## 2025-08-22 RX ORDER — TRAZODONE HYDROCHLORIDE 50 MG/1
50 TABLET ORAL NIGHTLY PRN
Qty: 30 TABLET | Refills: 2 | Status: SHIPPED | OUTPATIENT
Start: 2025-08-22